# Patient Record
Sex: MALE | Race: ASIAN | NOT HISPANIC OR LATINO | URBAN - METROPOLITAN AREA
[De-identification: names, ages, dates, MRNs, and addresses within clinical notes are randomized per-mention and may not be internally consistent; named-entity substitution may affect disease eponyms.]

---

## 2018-05-13 ENCOUNTER — EMERGENCY (EMERGENCY)
Facility: HOSPITAL | Age: 73
LOS: 1 days | Discharge: HOME | End: 2018-05-13
Attending: EMERGENCY MEDICINE | Admitting: EMERGENCY MEDICINE
Payer: MEDICARE

## 2018-05-13 VITALS — SYSTOLIC BLOOD PRESSURE: 162 MMHG | DIASTOLIC BLOOD PRESSURE: 90 MMHG

## 2018-05-13 VITALS
OXYGEN SATURATION: 97 % | TEMPERATURE: 98 F | HEART RATE: 74 BPM | RESPIRATION RATE: 18 BRPM | DIASTOLIC BLOOD PRESSURE: 102 MMHG | SYSTOLIC BLOOD PRESSURE: 155 MMHG

## 2018-05-13 LAB
ALBUMIN SERPL ELPH-MCNC: 4.6 G/DL — SIGNIFICANT CHANGE UP (ref 3.3–5)
ALP SERPL-CCNC: 78 U/L — SIGNIFICANT CHANGE UP (ref 40–120)
ALT FLD-CCNC: 21 U/L — SIGNIFICANT CHANGE UP (ref 4–41)
AST SERPL-CCNC: 25 U/L — SIGNIFICANT CHANGE UP (ref 4–40)
BASE EXCESS BLDV CALC-SCNC: 9.8 MMOL/L — SIGNIFICANT CHANGE UP
BASOPHILS # BLD AUTO: 0.04 K/UL — SIGNIFICANT CHANGE UP (ref 0–0.2)
BASOPHILS NFR BLD AUTO: 0.6 % — SIGNIFICANT CHANGE UP (ref 0–2)
BILIRUB SERPL-MCNC: 0.8 MG/DL — SIGNIFICANT CHANGE UP (ref 0.2–1.2)
BLOOD GAS VENOUS - CREATININE: 0.81 MG/DL — SIGNIFICANT CHANGE UP (ref 0.5–1.3)
BUN SERPL-MCNC: 9 MG/DL — SIGNIFICANT CHANGE UP (ref 7–23)
CALCIUM SERPL-MCNC: 9.9 MG/DL — SIGNIFICANT CHANGE UP (ref 8.4–10.5)
CHLORIDE BLDV-SCNC: 102 MMOL/L — SIGNIFICANT CHANGE UP (ref 96–108)
CHLORIDE SERPL-SCNC: 97 MMOL/L — LOW (ref 98–107)
CO2 SERPL-SCNC: 30 MMOL/L — SIGNIFICANT CHANGE UP (ref 22–31)
CREAT SERPL-MCNC: 0.82 MG/DL — SIGNIFICANT CHANGE UP (ref 0.5–1.3)
EOSINOPHIL # BLD AUTO: 0.55 K/UL — HIGH (ref 0–0.5)
EOSINOPHIL NFR BLD AUTO: 8.8 % — HIGH (ref 0–6)
GAS PNL BLDV: 137 MMOL/L — SIGNIFICANT CHANGE UP (ref 136–146)
GLUCOSE BLDV-MCNC: 125 — HIGH (ref 70–99)
GLUCOSE SERPL-MCNC: 114 MG/DL — HIGH (ref 70–99)
HCO3 BLDV-SCNC: 28 MMOL/L — HIGH (ref 20–27)
HCT VFR BLD CALC: 50.7 % — HIGH (ref 39–50)
HCT VFR BLDV CALC: 54.1 % — HIGH (ref 39–51)
HGB BLD-MCNC: 17.2 G/DL — HIGH (ref 13–17)
HGB BLDV-MCNC: 17.7 G/DL — HIGH (ref 13–17)
IMM GRANULOCYTES # BLD AUTO: 0.01 # — SIGNIFICANT CHANGE UP
IMM GRANULOCYTES NFR BLD AUTO: 0.2 % — SIGNIFICANT CHANGE UP (ref 0–1.5)
LACTATE BLDV-MCNC: 1.6 MMOL/L — SIGNIFICANT CHANGE UP (ref 0.5–2)
LYMPHOCYTES # BLD AUTO: 1.46 K/UL — SIGNIFICANT CHANGE UP (ref 1–3.3)
LYMPHOCYTES # BLD AUTO: 23.3 % — SIGNIFICANT CHANGE UP (ref 13–44)
MCHC RBC-ENTMCNC: 30.5 PG — SIGNIFICANT CHANGE UP (ref 27–34)
MCHC RBC-ENTMCNC: 33.9 % — SIGNIFICANT CHANGE UP (ref 32–36)
MCV RBC AUTO: 89.9 FL — SIGNIFICANT CHANGE UP (ref 80–100)
MONOCYTES # BLD AUTO: 0.37 K/UL — SIGNIFICANT CHANGE UP (ref 0–0.9)
MONOCYTES NFR BLD AUTO: 5.9 % — SIGNIFICANT CHANGE UP (ref 2–14)
NEUTROPHILS # BLD AUTO: 3.84 K/UL — SIGNIFICANT CHANGE UP (ref 1.8–7.4)
NEUTROPHILS NFR BLD AUTO: 61.2 % — SIGNIFICANT CHANGE UP (ref 43–77)
NRBC # FLD: 0 — SIGNIFICANT CHANGE UP
PCO2 BLDV: 69 MMHG — HIGH (ref 41–51)
PH BLDV: 7.34 PH — SIGNIFICANT CHANGE UP (ref 7.32–7.43)
PLATELET # BLD AUTO: 268 K/UL — SIGNIFICANT CHANGE UP (ref 150–400)
PMV BLD: 9.4 FL — SIGNIFICANT CHANGE UP (ref 7–13)
PO2 BLDV: < 24 MMHG — LOW (ref 35–40)
POTASSIUM BLDV-SCNC: 4.3 MMOL/L — SIGNIFICANT CHANGE UP (ref 3.4–4.5)
POTASSIUM SERPL-MCNC: 4.6 MMOL/L — SIGNIFICANT CHANGE UP (ref 3.5–5.3)
POTASSIUM SERPL-SCNC: 4.6 MMOL/L — SIGNIFICANT CHANGE UP (ref 3.5–5.3)
PROT SERPL-MCNC: 7.8 G/DL — SIGNIFICANT CHANGE UP (ref 6–8.3)
RBC # BLD: 5.64 M/UL — SIGNIFICANT CHANGE UP (ref 4.2–5.8)
RBC # FLD: 12.8 % — SIGNIFICANT CHANGE UP (ref 10.3–14.5)
SAO2 % BLDV: 23.2 % — LOW (ref 60–85)
SODIUM SERPL-SCNC: 139 MMOL/L — SIGNIFICANT CHANGE UP (ref 135–145)
WBC # BLD: 6.27 K/UL — SIGNIFICANT CHANGE UP (ref 3.8–10.5)
WBC # FLD AUTO: 6.27 K/UL — SIGNIFICANT CHANGE UP (ref 3.8–10.5)

## 2018-05-13 PROCEDURE — 71046 X-RAY EXAM CHEST 2 VIEWS: CPT | Mod: 26

## 2018-05-13 PROCEDURE — 99284 EMERGENCY DEPT VISIT MOD MDM: CPT | Mod: GC

## 2018-05-13 RX ORDER — ALBUTEROL 90 UG/1
2 AEROSOL, METERED ORAL ONCE
Qty: 0 | Refills: 0 | Status: COMPLETED | OUTPATIENT
Start: 2018-05-13 | End: 2018-05-13

## 2018-05-13 RX ORDER — IPRATROPIUM/ALBUTEROL SULFATE 18-103MCG
3 AEROSOL WITH ADAPTER (GRAM) INHALATION ONCE
Qty: 0 | Refills: 0 | Status: COMPLETED | OUTPATIENT
Start: 2018-05-13 | End: 2018-05-13

## 2018-05-13 RX ADMIN — ALBUTEROL 2 PUFF(S): 90 AEROSOL, METERED ORAL at 15:11

## 2018-05-13 RX ADMIN — Medication 3 MILLILITER(S): at 12:41

## 2018-05-13 NOTE — ED ADULT TRIAGE NOTE - CHIEF COMPLAINT QUOTE
Patient reports dry cough since Thursday. Reports subjective SOB during coughing spells. Patient in no distress at rest. afebrile and denies chest pain

## 2018-05-13 NOTE — ED PROVIDER NOTE - PROGRESS NOTE DETAILS
Lab and CXR were unremarkable. Pt will be sent home with an MDI. All results d/w patient and copies given, in no distress, well appearing, wheezing res post neb, instructed to f/u pcp in next 2 days and bring results with him, vss, alejandro po, amb steadily. Pt will be sent home with an MDI.

## 2018-05-13 NOTE — ED PROVIDER NOTE - OBJECTIVE STATEMENT
71yo M w/ pmhx of HLD, recently started on Losartan for HTN, c/o SOB for 6 days. Pt also reports runny nose and wheezing. Denies any fever, chills, nausea, vomiting, chest pain, sick contacts, recent travel, leg swelling, hx of anemia, asthma, COPD, not smoker. Pt saw PCP for SOB on Wednesday, was given Z-Loco, which he started yesterday. Denies any other symptoms. 73yo M w/ pmhx of HLD, recently started on Losartan for HTN, c/o cough for 6 days. Pt also reports runny nose and wheezing. Denies any fever, chills, nausea, vomiting, chest pain, sick contacts, recent travel, leg swelling, hx of anemia, asthma, COPD, not smoker. Pt saw PCP for SOB on Wednesday, was given Z-Willis, which he started yesterday. Denies any other symptoms.

## 2018-05-13 NOTE — ED PROVIDER NOTE - MEDICAL DECISION MAKING DETAILS
SOB, runny nose and wheezing, but no hx of asthma, COPD, smoking, fever, chills, started Z-Willis yesterday, likely PNA vs Bronchitis  - basic labs, CXR, Duoneb

## 2018-05-13 NOTE — ED ADULT NURSE NOTE - CAS TRG GEN SKIN CONDITION
Warm 12.6   15.8  )-----------( 261      ( 13 Dec 2017 00:28 )             37.4   12-13    136  |  102  |  20<H>  ----------------------------<  168<H>  4.6   |  27  |  0.91    Ca    9.1      13 Dec 2017 00:28    TPro  8.3  /  Alb  4.1  /  TBili  0.5  /  DBili  x   /  AST  17  /  ALT  17  /  AlkPhos  78  12-13  PT/INR - ( 13 Dec 2017 00:28 )   PT: 10.5 sec;   INR: 0.96 ratio         PTT - ( 13 Dec 2017 00:28 )  PTT:32.4 sec    < from: CT Head No Cont (12.13.17 @ 01:56) >      IMPRESSION:    No acute intracranial pathology.    < end of copied text >    Vital Signs Last 24 Hrs  T(C): 36.7 (12 Dec 2017 23:24), Max: 36.7 (12 Dec 2017 23:24)  T(F): 98 (12 Dec 2017 23:24), Max: 98 (12 Dec 2017 23:24)  HR: 96 (12 Dec 2017 23:24) (96 - 96)  BP: 151/86 (12 Dec 2017 23:24) (151/86 - 151/86)  BP(mean): --  RR: 18 (12 Dec 2017 23:24) (18 - 18)  SpO2: 98% (12 Dec 2017 23:24) (98% - 98%)

## 2018-05-13 NOTE — ED ADULT NURSE NOTE - OBJECTIVE STATEMENT
Patient reports productive cough and SOB on exertion since Tuesday 05/08/2018. Patient reports seeing his PMD and was prescribed Z-pack and has been taking antibiotics. Patient reports medications has not relieved any symptoms. Patient reports SOB especially after taking stairs or lying flat for long periods of time. Patient denies any chest pain, syncope or any SOB at rest. Patient has unlabored respirations with bilateral chest rise, able to converse without difficulty. Patient denies any fever/chills, diaphoresis or edema in extremities.

## 2018-05-13 NOTE — ED PROVIDER NOTE - ATTENDING CONTRIBUTION TO CARE
Patient with cough x 5 days. Patient notes he developed cough, nasal congestion and feels sob during coughing episodes-all started approx 5 days ago. Saw his md who started him on azithromycin (started yesterday) and losartan for bp. Patient states occ prod of yellow sputum. no fevers, chills, ha, cp, abd pain, vomiting, diarrhea, dysuria, le edema. No recent travel or sick contact.  exam  GEN - NAD; well appearing; A+O x3   HEAD - NC/AT   EYES- PERRL, EOMI  ENT: Airway patent, mmm, Oral cavity and pharynx normal. No inflammation, swelling, exudate, or lesions.  NECK: Neck supple, non-tender without lymphadenopathy, no masses.  PULMONARY - int exp wheezes b/l, unlabored, no rales, symmetric breath sounds.   CARDIAC -s1s2, RRR, no M,G,R  ABDOMEN - +BS, ND, NT, soft, no guarding, no rebound, no masses   BACK - no CVA tenderness, Normal  spine   EXTREMITIES - FROM, symmetric pulses, capillary refill < 2 seconds, no edema   SKIN - no rash or bruising   NEUROLOGIC - alert, speech clear, no focal deficits  PSYCH -nl mood/affect, nl insight.  a/p-patient with cough/nasal congestion x 5 days, int exp wheezing noted b/l, vss, well appearing, will check labs, cxr, trial duoneb for likely bronchitis, monitor, reass.

## 2018-12-20 ENCOUNTER — EMERGENCY (EMERGENCY)
Facility: HOSPITAL | Age: 73
LOS: 1 days | Discharge: HOME | End: 2018-12-20
Attending: EMERGENCY MEDICINE | Admitting: EMERGENCY MEDICINE
Payer: MEDICARE

## 2018-12-20 VITALS
HEART RATE: 68 BPM | RESPIRATION RATE: 14 BRPM | OXYGEN SATURATION: 100 % | DIASTOLIC BLOOD PRESSURE: 90 MMHG | TEMPERATURE: 98 F | SYSTOLIC BLOOD PRESSURE: 167 MMHG

## 2018-12-20 VITALS
TEMPERATURE: 98 F | SYSTOLIC BLOOD PRESSURE: 164 MMHG | OXYGEN SATURATION: 100 % | DIASTOLIC BLOOD PRESSURE: 74 MMHG | RESPIRATION RATE: 17 BRPM | HEART RATE: 77 BPM

## 2018-12-20 PROBLEM — E78.00 PURE HYPERCHOLESTEROLEMIA, UNSPECIFIED: Chronic | Status: ACTIVE | Noted: 2018-05-13

## 2018-12-20 LAB
ALBUMIN SERPL ELPH-MCNC: 4.3 G/DL — SIGNIFICANT CHANGE UP (ref 3.3–5)
ALBUMIN SERPL ELPH-MCNC: 4.4 G/DL — SIGNIFICANT CHANGE UP (ref 3.3–5)
ALP SERPL-CCNC: 67 U/L — SIGNIFICANT CHANGE UP (ref 40–120)
ALP SERPL-CCNC: 69 U/L — SIGNIFICANT CHANGE UP (ref 40–120)
ALT FLD-CCNC: 16 U/L — SIGNIFICANT CHANGE UP (ref 4–41)
ALT FLD-CCNC: 17 U/L — SIGNIFICANT CHANGE UP (ref 4–41)
APTT BLD: 30.6 SEC — SIGNIFICANT CHANGE UP (ref 27.5–36.3)
APTT BLD: 31.6 SEC — SIGNIFICANT CHANGE UP (ref 27.5–36.3)
AST SERPL-CCNC: 24 U/L — SIGNIFICANT CHANGE UP (ref 4–40)
AST SERPL-CCNC: 26 U/L — SIGNIFICANT CHANGE UP (ref 4–40)
BILIRUB SERPL-MCNC: 0.5 MG/DL — SIGNIFICANT CHANGE UP (ref 0.2–1.2)
BILIRUB SERPL-MCNC: 0.7 MG/DL — SIGNIFICANT CHANGE UP (ref 0.2–1.2)
BLD GP AB SCN SERPL QL: NEGATIVE — SIGNIFICANT CHANGE UP
BUN SERPL-MCNC: 11 MG/DL — SIGNIFICANT CHANGE UP (ref 7–23)
BUN SERPL-MCNC: 12 MG/DL — SIGNIFICANT CHANGE UP (ref 7–23)
BUN SERPL-MCNC: 12 MG/DL — SIGNIFICANT CHANGE UP (ref 7–23)
BUN SERPL-MCNC: 14 MG/DL — SIGNIFICANT CHANGE UP (ref 7–23)
CALCIUM SERPL-MCNC: 8.9 MG/DL — SIGNIFICANT CHANGE UP (ref 8.4–10.5)
CALCIUM SERPL-MCNC: 9 MG/DL — SIGNIFICANT CHANGE UP (ref 8.4–10.5)
CALCIUM SERPL-MCNC: 9 MG/DL — SIGNIFICANT CHANGE UP (ref 8.4–10.5)
CALCIUM SERPL-MCNC: 9.5 MG/DL — SIGNIFICANT CHANGE UP (ref 8.4–10.5)
CHLORIDE SERPL-SCNC: 94 MMOL/L — LOW (ref 98–107)
CHLORIDE SERPL-SCNC: 97 MMOL/L — LOW (ref 98–107)
CHLORIDE SERPL-SCNC: 99 MMOL/L — SIGNIFICANT CHANGE UP (ref 98–107)
CHLORIDE SERPL-SCNC: 99 MMOL/L — SIGNIFICANT CHANGE UP (ref 98–107)
CO2 SERPL-SCNC: 23 MMOL/L — SIGNIFICANT CHANGE UP (ref 22–31)
CO2 SERPL-SCNC: 27 MMOL/L — SIGNIFICANT CHANGE UP (ref 22–31)
CREAT SERPL-MCNC: 0.75 MG/DL — SIGNIFICANT CHANGE UP (ref 0.5–1.3)
CREAT SERPL-MCNC: 0.79 MG/DL — SIGNIFICANT CHANGE UP (ref 0.5–1.3)
CREAT SERPL-MCNC: 0.81 MG/DL — SIGNIFICANT CHANGE UP (ref 0.5–1.3)
CREAT SERPL-MCNC: 0.81 MG/DL — SIGNIFICANT CHANGE UP (ref 0.5–1.3)
GLUCOSE SERPL-MCNC: 128 MG/DL — HIGH (ref 70–99)
GLUCOSE SERPL-MCNC: 131 MG/DL — HIGH (ref 70–99)
GLUCOSE SERPL-MCNC: 131 MG/DL — HIGH (ref 70–99)
GLUCOSE SERPL-MCNC: 164 MG/DL — HIGH (ref 70–99)
HCT VFR BLD CALC: 45.6 % — SIGNIFICANT CHANGE UP (ref 39–50)
HGB BLD-MCNC: 15.2 G/DL — SIGNIFICANT CHANGE UP (ref 13–17)
INR BLD: 1 — SIGNIFICANT CHANGE UP (ref 0.88–1.17)
INR BLD: 1.03 — SIGNIFICANT CHANGE UP (ref 0.88–1.17)
LIDOCAIN IGE QN: 40.4 U/L — SIGNIFICANT CHANGE UP (ref 7–60)
LIDOCAIN IGE QN: 46 U/L — SIGNIFICANT CHANGE UP (ref 7–60)
MCHC RBC-ENTMCNC: 30.3 PG — SIGNIFICANT CHANGE UP (ref 27–34)
MCHC RBC-ENTMCNC: 33.3 % — SIGNIFICANT CHANGE UP (ref 32–36)
MCV RBC AUTO: 90.8 FL — SIGNIFICANT CHANGE UP (ref 80–100)
NRBC # FLD: 0 — SIGNIFICANT CHANGE UP
PLATELET # BLD AUTO: 211 K/UL — SIGNIFICANT CHANGE UP (ref 150–400)
PMV BLD: 10.1 FL — SIGNIFICANT CHANGE UP (ref 7–13)
POTASSIUM SERPL-MCNC: 3.9 MMOL/L — SIGNIFICANT CHANGE UP (ref 3.5–5.3)
POTASSIUM SERPL-MCNC: 3.9 MMOL/L — SIGNIFICANT CHANGE UP (ref 3.5–5.3)
POTASSIUM SERPL-MCNC: 4.3 MMOL/L — SIGNIFICANT CHANGE UP (ref 3.5–5.3)
POTASSIUM SERPL-MCNC: 6.2 MMOL/L — CRITICAL HIGH (ref 3.5–5.3)
POTASSIUM SERPL-SCNC: 3.9 MMOL/L — SIGNIFICANT CHANGE UP (ref 3.5–5.3)
POTASSIUM SERPL-SCNC: 3.9 MMOL/L — SIGNIFICANT CHANGE UP (ref 3.5–5.3)
POTASSIUM SERPL-SCNC: 4.3 MMOL/L — SIGNIFICANT CHANGE UP (ref 3.5–5.3)
POTASSIUM SERPL-SCNC: 6.2 MMOL/L — CRITICAL HIGH (ref 3.5–5.3)
PROT SERPL-MCNC: 7 G/DL — SIGNIFICANT CHANGE UP (ref 6–8.3)
PROT SERPL-MCNC: 7.1 G/DL — SIGNIFICANT CHANGE UP (ref 6–8.3)
PROTHROM AB SERPL-ACNC: 11.1 SEC — SIGNIFICANT CHANGE UP (ref 9.8–13.1)
PROTHROM AB SERPL-ACNC: 11.8 SEC — SIGNIFICANT CHANGE UP (ref 9.8–13.1)
RBC # BLD: 5.02 M/UL — SIGNIFICANT CHANGE UP (ref 4.2–5.8)
RBC # FLD: 12.5 % — SIGNIFICANT CHANGE UP (ref 10.3–14.5)
RH IG SCN BLD-IMP: POSITIVE — SIGNIFICANT CHANGE UP
SODIUM SERPL-SCNC: 132 MMOL/L — LOW (ref 135–145)
SODIUM SERPL-SCNC: 135 MMOL/L — SIGNIFICANT CHANGE UP (ref 135–145)
SODIUM SERPL-SCNC: 138 MMOL/L — SIGNIFICANT CHANGE UP (ref 135–145)
SODIUM SERPL-SCNC: 138 MMOL/L — SIGNIFICANT CHANGE UP (ref 135–145)
TROPONIN T, HIGH SENSITIVITY: 12 NG/L — SIGNIFICANT CHANGE UP (ref ?–14)
TROPONIN T, HIGH SENSITIVITY: 19 NG/L — SIGNIFICANT CHANGE UP (ref ?–14)
TROPONIN T, HIGH SENSITIVITY: 23 NG/L — SIGNIFICANT CHANGE UP (ref ?–14)
WBC # BLD: 11.34 K/UL — HIGH (ref 3.8–10.5)
WBC # FLD AUTO: 11.34 K/UL — HIGH (ref 3.8–10.5)

## 2018-12-20 PROCEDURE — 99284 EMERGENCY DEPT VISIT MOD MDM: CPT | Mod: 25,GC

## 2018-12-20 PROCEDURE — 76705 ECHO EXAM OF ABDOMEN: CPT | Mod: 26

## 2018-12-20 PROCEDURE — 74177 CT ABD & PELVIS W/CONTRAST: CPT | Mod: 26

## 2018-12-20 PROCEDURE — 93010 ELECTROCARDIOGRAM REPORT: CPT

## 2018-12-20 PROCEDURE — 99053 MED SERV 10PM-8AM 24 HR FAC: CPT

## 2018-12-20 RX ORDER — LIDOCAINE 4 G/100G
10 CREAM TOPICAL ONCE
Qty: 0 | Refills: 0 | Status: COMPLETED | OUTPATIENT
Start: 2018-12-20 | End: 2018-12-20

## 2018-12-20 RX ORDER — SODIUM CHLORIDE 9 MG/ML
1000 INJECTION INTRAMUSCULAR; INTRAVENOUS; SUBCUTANEOUS
Qty: 0 | Refills: 0 | Status: DISCONTINUED | OUTPATIENT
Start: 2018-12-20 | End: 2018-12-20

## 2018-12-20 RX ORDER — FAMOTIDINE 10 MG/ML
20 INJECTION INTRAVENOUS ONCE
Qty: 0 | Refills: 0 | Status: COMPLETED | OUTPATIENT
Start: 2018-12-20 | End: 2018-12-20

## 2018-12-20 RX ORDER — SODIUM CHLORIDE 9 MG/ML
1000 INJECTION INTRAMUSCULAR; INTRAVENOUS; SUBCUTANEOUS ONCE
Qty: 0 | Refills: 0 | Status: COMPLETED | OUTPATIENT
Start: 2018-12-20 | End: 2018-12-20

## 2018-12-20 RX ORDER — ONDANSETRON 8 MG/1
1 TABLET, FILM COATED ORAL
Qty: 6 | Refills: 0 | OUTPATIENT
Start: 2018-12-20 | End: 2018-12-21

## 2018-12-20 RX ORDER — CEFOTETAN DISODIUM 1 G
2 VIAL (EA) INJECTION ONCE
Qty: 0 | Refills: 0 | Status: COMPLETED | OUTPATIENT
Start: 2018-12-20 | End: 2018-12-20

## 2018-12-20 RX ORDER — ONDANSETRON 8 MG/1
4 TABLET, FILM COATED ORAL ONCE
Qty: 0 | Refills: 0 | Status: COMPLETED | OUTPATIENT
Start: 2018-12-20 | End: 2018-12-20

## 2018-12-20 RX ADMIN — Medication 30 MILLILITER(S): at 06:18

## 2018-12-20 RX ADMIN — FAMOTIDINE 20 MILLIGRAM(S): 10 INJECTION INTRAVENOUS at 06:19

## 2018-12-20 RX ADMIN — Medication 100 GRAM(S): at 11:24

## 2018-12-20 RX ADMIN — Medication 2 GRAM(S): at 11:51

## 2018-12-20 RX ADMIN — ONDANSETRON 4 MILLIGRAM(S): 8 TABLET, FILM COATED ORAL at 06:18

## 2018-12-20 RX ADMIN — SODIUM CHLORIDE 1333.33 MILLILITER(S): 9 INJECTION INTRAMUSCULAR; INTRAVENOUS; SUBCUTANEOUS at 06:19

## 2018-12-20 RX ADMIN — SODIUM CHLORIDE 1000 MILLILITER(S): 9 INJECTION INTRAMUSCULAR; INTRAVENOUS; SUBCUTANEOUS at 07:13

## 2018-12-20 RX ADMIN — SODIUM CHLORIDE 100 MILLILITER(S): 9 INJECTION INTRAMUSCULAR; INTRAVENOUS; SUBCUTANEOUS at 11:24

## 2018-12-20 RX ADMIN — LIDOCAINE 10 MILLILITER(S): 4 CREAM TOPICAL at 06:18

## 2018-12-20 NOTE — CONSULT NOTE ADULT - SUBJECTIVE AND OBJECTIVE BOX
CC: 72y old Male admitted with a chief complaint of abdominal pain, now    HPI:· Patient is a 71 y/o male w/ pmhx HTN, HLD presnted to the ED with abdominal pain. Patient states at 11pm last night he started having epigastric and generalized abdominal discomfort, 10/10, with associated ten episodes of vomiting. The pain started about an hour after he ate dinner. He had one formed bowel movement soon after food intake. Denies CP, SOB, palpitations, fevers/chills, diarrhea, constipation, melena, hematochezia.  No history of CAD. States he had a stress test earlier this year which was normal. He has never had an episode like this before.      PMHx: High cholesterol    PSHx: No significant past surgical history    Medications (inpatient):   Medications (PRN):  Allergies: No Known Allergies    Family Hx: No pertinent family history in first degree relatives      Physical Exam  T(C): 36.8  HR: 77 (68 - 77)  BP: 164/74 (164/74 - 179/80)  RR: 17 (14 - 18)  SpO2: 100% (100% - 100%)  Tmax: T(C): , Max: 36.9 (12-20-18 @ 05:40)    General: well developed, well nourished, NAD  Neuro: alert and oriented, no focal deficits, moves all extremities spontaneously  HEENT: NCAT, EOMI, anicteric, mucosa moist  Respiratory: airway patent, respirations unlabored  CVS: regular rate and rhythm  Abdomen: soft, nontender, nondistended  Extremities: no edema, sensation and movement grossly intact  Skin: warm, dry, appropriate color    Labs:                        15.2   11.34 )-----------( 211      ( 20 Dec 2018 06:29 )             45.6     PT/INR - ( 20 Dec 2018 10:09 )   PT: 11.8 SEC;   INR: 1.03          PTT - ( 20 Dec 2018 10:09 )  PTT:31.6 SEC  12-20    135  |  97<L>  |  11  ----------------------------<  128<H>  4.3   |  27  |  0.75    Ca    8.9      20 Dec 2018 10:09    TPro  7.1  /  Alb  4.3  /  TBili  0.7  /  DBili  x   /  AST  26  /  ALT  17  /  AlkPhos  67  12-20            Imaging and other studies:    < from: US Abdomen Limited (12.20.18 @ 09:38) >  FINDINGS:    Liver: Within normal limits.    Bile ducts: Normal caliber. Common hepatic duct measures 4 mm.     Gallbladder: Minimal intraluminal sludge with mild gallbladder wall   thickening. Negative sonographic Nur's sign.        < end of copied text >      < from: CT Abdomen and Pelvis w/ IV Cont (12.20.18 @ 09:06) >  IMPRESSION:     Acute cholecystitis.    A 4 mm stone within the cystic duct.    < end of copied text >

## 2018-12-20 NOTE — CONSULT NOTE ADULT - ASSESSMENT
Patient is a 72M with acute cholecystitis.  Pain is currently improved and patient does not want surgery at this time.    - PO challenge  - Can d/c home with 1 week of augmentin it patient tolerates PO  - Patient can follow up with Dr. Lindsey as outpatient to schedule an elective cholecystectomy  - Case discussed with Dr. Lindsey.    LÓPEZ Batista PGY2  [phone]

## 2018-12-20 NOTE — ED PROVIDER NOTE - PHYSICAL EXAMINATION
PHYSICAL EXAM:  GENERAL: NAD, well-groomed, well-developed  HEAD:  Atraumatic, Normocephalic  EYES: EOMI, PERRLA, conjunctiva and sclera clear  ENMT: No tonsillar erythema, exudates, or enlargement; Moist mucous membranes, Good dentition, No lesions  NECK: Supple, No JVD, Normal thyroid  CHEST/LUNG: Clear to percussion bilaterally; No rales, rhonchi, wheezing, or rubs  HEART: Regular rate and rhythm; No murmurs, rubs, or gallops  ABDOMEN: Soft, Nontender, Nondistended; Bowel sounds present  EXTREMITIES:  2+ Peripheral Pulses, No clubbing, cyanosis, or edema  LYMPH: No lymphadenopathy noted  SKIN: No rashes or lesions  NERVOUS SYSTEM:  Alert & Oriented X3, Good concentration; Motor Strength 5/5 B/L upper and lower extremities; DTRs 2+ intact and symmetric

## 2018-12-20 NOTE — ED PROVIDER NOTE - MEDICAL DECISION MAKING DETAILS
Patient w/ abdominal pain, most likely 2/2 food ingestion, possible GERD, gastroenteritis, however must r/o ACS as patient elderly, hx of HTN. Will obtain CBC, BMP, trops, ekg. Will give IVF, pepcid, zofran, viscous lidocaine.

## 2018-12-20 NOTE — ED PROVIDER NOTE - PROGRESS NOTE DETAILS
AG PGY2: patient reports pain is improved, but still in some discomfort. slightly tender in RUQ. Discussed ultrasound to evaluate for biliary etiology. Patient requesting CT at this time. AG PGY2: surg recommendation to go home on augmentin and f/u with surg next week. Well appearing and VSS. will po challenge and d/c home with return precautions.

## 2018-12-20 NOTE — ED PROVIDER NOTE - CHPI ED SYMPTOMS NEG
no abdominal distension/no blood in stool/no dysuria/no fever/no diarrhea/no burning urination/no chills/no hematuria

## 2018-12-20 NOTE — ED PROVIDER NOTE - OBJECTIVE STATEMENT
71 y/o male w/ pmhx HTN, HLD p/w c/o of 71 y/o [place]an male w/ pmhx HTN, HLD p/w c/o of abdominal pain. Patient states at 11pm, he started having epigastric and generalized abdominal discomfort, 10/10, with associated ten episodes of vomiting. He states prior to this, he ate dinner at 10:00pm, having fish leos, cabbage, ice cream, and papayaya. Had one formed bowel movement soon after food intake. Denies CP, SOB, palpitations, fevers/chills, palpitations, diarrhea, constipation, melena, hematochezia. No one in the family with similar symptoms. No history of CAD. States he had a stress test earlier this year which was normal. No smoking history. Occasional etoh intake. Currently pain has reduced to 7/10.

## 2018-12-20 NOTE — ED PROVIDER NOTE - CARE PROVIDER_API CALL
Gómez Lindsey), Dietitian nutritionist; Surgery; Surgical Critical Care  [address] [facility]  [address] [address]  Phone: [phone]  Fax: [phone]

## 2018-12-20 NOTE — ED ADULT NURSE REASSESSMENT NOTE - NS ED NURSE REASSESS COMMENT FT1
Received report from SHASHI Naranjo. Pt A&OX3 laying in bed comfortably, family at bedside. Pt c/o abdominal pain. Respirations equal, nonlabored, no sign of respiratory distress. Abdomen soft, tender to touch, no distention noted. 18 gauge noted to left hand flushes well no sign of redness or infiltration. Denies chest pain, SOB, N/V/D at this time. Pt stable, will monitor

## 2018-12-20 NOTE — ED ADULT NURSE NOTE - OBJECTIVE STATEMENT
Pt brought into room 15. A&OX4 ambulatory self care male presents to the ED today for generalized abdominal pain with vomiting since 11pm. Patient states he ate fish and ice-cream last night and had 10 episodes of vomiting. Patient records one bowel movement after eating. Upon assessment abdomin is soft and non tender to touch. Bowel sounds present in all 4 quadrants. Denies sob/cp. 18G IV placed in right AC. Labs sent. Medications given as ordered.

## 2018-12-20 NOTE — ED PROVIDER NOTE - NS ED ROS FT
REVIEW OF SYSTEMS:  CONSTITUTIONAL: No fever, weight loss, or fatigue  EYES: No eye pain, visual disturbances, or discharge  ENMT:  No difficulty hearing, tinnitus, vertigo; No sinus or throat pain  NECK: No pain or stiffness  BREASTS: No pain, masses, or nipple discharge  RESPIRATORY: No cough, wheezing, chills or hemoptysis; No shortness of breath  CARDIOVASCULAR: No chest pain, palpitations, dizziness, or leg swelling  GASTROINTESTINAL: No abdominal or epigastric pain. No nausea, vomiting, or hematemesis; No diarrhea or constipation. No melena or hematochezia.  GENITOURINARY: No dysuria, frequency, hematuria, or incontinence  NEUROLOGICAL: No headaches, memory loss, loss of strength, numbness, or tremors  SKIN: No itching, burning, rashes, or lesions   LYMPH NODES: No enlarged glands  ENDOCRINE: No heat or cold intolerance; No hair loss  MUSCULOSKELETAL: No joint pain or swelling; No muscle, back, or extremity pain  PSYCHIATRIC: No depression, anxiety, mood swings, or difficulty sleeping  HEME/LYMPH: No easy bruising, or bleeding gums  ALLERY AND IMMUNOLOGIC: No hives or eczema REVIEW OF SYSTEMS:  CONSTITUTIONAL: No fever, weight loss, or fatigue  EYES: No eye pain, visual disturbances, or discharge  ENMT:  No difficulty hearing, tinnitus, vertigo; No sinus or throat pain  NECK: No pain or stiffness  RESPIRATORY: No cough, wheezing, chills or hemoptysis; No shortness of breath  CARDIOVASCULAR: No chest pain, palpitations, dizziness, or leg swelling  GASTROINTESTINAL: +abdominal pain-diffuse. + vomiting. no hematemesis; No diarrhea or constipation. No melena or hematochezia.  GENITOURINARY: No dysuria, frequency, hematuria, or incontinence  SKIN: No itching, burning, rashes, or lesions   LYMPH NODES: No enlarged glands  ENDOCRINE: No heat or cold intolerance; No hair loss  MUSCULOSKELETAL: No joint pain or swelling; No muscle, back, or extremity pain  HEME/LYMPH: No easy bruising, or bleeding gums  ALLERY AND IMMUNOLOGIC: No hives or eczema

## 2018-12-20 NOTE — ED PROVIDER NOTE - NSFOLLOWUPCLINICS_GEN_ALL_ED_FT
[facility]  General Surgery  [address] [place] [address] - [address] [place] [address]  Phone: [phone]  Fax:   Follow Up Time:

## 2018-12-20 NOTE — ED PROVIDER NOTE - ATTENDING CONTRIBUTION TO CARE
MD Loo:  I performed a face to face bedside interview with patient regarding history of present illness, review of symptoms and past medical history. I completed an independent physical exam(documented below).  I have discussed patient's plan of care with resident.   I agree with note as stated above, having amended the EMR as needed to reflect my findings. I have discussed the assessment and plan of care.  This includes during the time I functioned as the attending physician for this patient.  PE:  Gen: Alert, NAD  Head: NC, AT,  EOMI, normal lids/conjunctiva  ENT:  normal hearing, patent oropharynx without erythema/exudate  Neck: +supple, no tenderness/meningismus/JVD, +Trachea midline  Chest: no chest wall tenderness, equal chest rise  Pulm: Bilateral BS, normal resp effort, no wheeze/stridor/retractions  CV: RRR, no M/R/G, +dist pulses  Abd: +BS, soft, ND, nttp on my exam  Rectal: deferred  Mskel: no edema/erythema/cyanosis  Skin: no rash  Neuro: AAOx3  MDM:  71yo M w/ pmh of htn, hld, c/o epigastric burning pain, generalized abd discomfort, nausea and 10episodes of vomiting, all started at 11pm approx 1hr after eating fish leos,cabbage, ice cream and papaya. Pt states he had a normal stress test earlier this year. Denies cp, sob, palpitations. No PE risk factors. Ddx includes GERD vs atypical ACS vs gastritis. ECG is NSR w/ incomplete RBBB(seen on previous ECG as well), without ischemic findings. Labs including trop, meds, reassess.

## 2018-12-20 NOTE — ED ADULT TRIAGE NOTE - CHIEF COMPLAINT QUOTE
pt c/o generalized abdominal pain since yesterday. Pt states pain/multiple episodes of vomiting post eating fish and ice cream. Pt appears in NAD. Hx of HLD, no abdominal surgeries.

## 2018-12-21 NOTE — ED POST DISCHARGE NOTE - ADDITIONAL DOCUMENTATION
Spoke with patient's daughter Iesha who states patient currently at [facility] and will be getting a cholecystectomy.  Discussed results with daughter who is patient's health care proxy who states patient has cardiologist Dr. Olivo [phone] who is aware of results and will be following up with patient.

## 2018-12-23 ENCOUNTER — INPATIENT (INPATIENT)
Facility: HOSPITAL | Age: 73
LOS: 2 days | Discharge: HOME | End: 2018-12-26
Attending: INTERNAL MEDICINE | Admitting: INTERNAL MEDICINE
Payer: MEDICARE

## 2018-12-23 VITALS
HEART RATE: 78 BPM | OXYGEN SATURATION: 98 % | TEMPERATURE: 99 F | RESPIRATION RATE: 18 BRPM | SYSTOLIC BLOOD PRESSURE: 140 MMHG | DIASTOLIC BLOOD PRESSURE: 84 MMHG

## 2018-12-23 DIAGNOSIS — K81.9 CHOLECYSTITIS, UNSPECIFIED: ICD-10-CM

## 2018-12-23 LAB
ALBUMIN SERPL ELPH-MCNC: 3.8 G/DL — SIGNIFICANT CHANGE UP (ref 3.3–5)
ALP SERPL-CCNC: 75 U/L — SIGNIFICANT CHANGE UP (ref 40–120)
ALT FLD-CCNC: 24 U/L — SIGNIFICANT CHANGE UP (ref 4–41)
APPEARANCE UR: CLEAR — SIGNIFICANT CHANGE UP
APTT BLD: 33.2 SEC — SIGNIFICANT CHANGE UP (ref 27.5–36.3)
AST SERPL-CCNC: 65 U/L — HIGH (ref 4–40)
BACTERIA # UR AUTO: NEGATIVE — SIGNIFICANT CHANGE UP
BASOPHILS # BLD AUTO: 0.03 K/UL — SIGNIFICANT CHANGE UP (ref 0–0.2)
BASOPHILS NFR BLD AUTO: 0.3 % — SIGNIFICANT CHANGE UP (ref 0–2)
BILIRUB SERPL-MCNC: 0.8 MG/DL — SIGNIFICANT CHANGE UP (ref 0.2–1.2)
BILIRUB UR-MCNC: NEGATIVE — SIGNIFICANT CHANGE UP
BLD GP AB SCN SERPL QL: NEGATIVE — SIGNIFICANT CHANGE UP
BLOOD UR QL VISUAL: NEGATIVE — SIGNIFICANT CHANGE UP
BUN SERPL-MCNC: 13 MG/DL — SIGNIFICANT CHANGE UP (ref 7–23)
CALCIUM SERPL-MCNC: 9.5 MG/DL — SIGNIFICANT CHANGE UP (ref 8.4–10.5)
CHLORIDE SERPL-SCNC: 97 MMOL/L — LOW (ref 98–107)
CO2 SERPL-SCNC: 26 MMOL/L — SIGNIFICANT CHANGE UP (ref 22–31)
COLOR SPEC: YELLOW — SIGNIFICANT CHANGE UP
CREAT SERPL-MCNC: 0.91 MG/DL — SIGNIFICANT CHANGE UP (ref 0.5–1.3)
EOSINOPHIL # BLD AUTO: 0.05 K/UL — SIGNIFICANT CHANGE UP (ref 0–0.5)
EOSINOPHIL NFR BLD AUTO: 0.5 % — SIGNIFICANT CHANGE UP (ref 0–6)
GLUCOSE SERPL-MCNC: 84 MG/DL — SIGNIFICANT CHANGE UP (ref 70–99)
GLUCOSE UR-MCNC: NEGATIVE — SIGNIFICANT CHANGE UP
HCT VFR BLD CALC: 43.8 % — SIGNIFICANT CHANGE UP (ref 39–50)
HGB BLD-MCNC: 14.9 G/DL — SIGNIFICANT CHANGE UP (ref 13–17)
HYALINE CASTS # UR AUTO: SIGNIFICANT CHANGE UP
IMM GRANULOCYTES # BLD AUTO: 0.02 # — SIGNIFICANT CHANGE UP
IMM GRANULOCYTES NFR BLD AUTO: 0.2 % — SIGNIFICANT CHANGE UP (ref 0–1.5)
INR BLD: 1.12 — SIGNIFICANT CHANGE UP (ref 0.88–1.17)
KETONES UR-MCNC: HIGH
LEUKOCYTE ESTERASE UR-ACNC: NEGATIVE — SIGNIFICANT CHANGE UP
LYMPHOCYTES # BLD AUTO: 0.96 K/UL — LOW (ref 1–3.3)
LYMPHOCYTES # BLD AUTO: 10.1 % — LOW (ref 13–44)
MCHC RBC-ENTMCNC: 30.8 PG — SIGNIFICANT CHANGE UP (ref 27–34)
MCHC RBC-ENTMCNC: 34 % — SIGNIFICANT CHANGE UP (ref 32–36)
MCV RBC AUTO: 90.7 FL — SIGNIFICANT CHANGE UP (ref 80–100)
MONOCYTES # BLD AUTO: 0.89 K/UL — SIGNIFICANT CHANGE UP (ref 0–0.9)
MONOCYTES NFR BLD AUTO: 9.3 % — SIGNIFICANT CHANGE UP (ref 2–14)
NEUTROPHILS # BLD AUTO: 7.57 K/UL — HIGH (ref 1.8–7.4)
NEUTROPHILS NFR BLD AUTO: 79.6 % — HIGH (ref 43–77)
NITRITE UR-MCNC: NEGATIVE — SIGNIFICANT CHANGE UP
NRBC # FLD: 0 — SIGNIFICANT CHANGE UP
PH UR: 6 — SIGNIFICANT CHANGE UP (ref 5–8)
PLATELET # BLD AUTO: 183 K/UL — SIGNIFICANT CHANGE UP (ref 150–400)
PMV BLD: 9.4 FL — SIGNIFICANT CHANGE UP (ref 7–13)
POTASSIUM SERPL-MCNC: 3.5 MMOL/L — SIGNIFICANT CHANGE UP (ref 3.5–5.3)
POTASSIUM SERPL-SCNC: 3.5 MMOL/L — SIGNIFICANT CHANGE UP (ref 3.5–5.3)
PROT SERPL-MCNC: 7.6 G/DL — SIGNIFICANT CHANGE UP (ref 6–8.3)
PROT UR-MCNC: 50 — SIGNIFICANT CHANGE UP
PROTHROM AB SERPL-ACNC: 12.5 SEC — SIGNIFICANT CHANGE UP (ref 9.8–13.1)
RBC # BLD: 4.83 M/UL — SIGNIFICANT CHANGE UP (ref 4.2–5.8)
RBC # FLD: 12.8 % — SIGNIFICANT CHANGE UP (ref 10.3–14.5)
RBC CASTS # UR COMP ASSIST: SIGNIFICANT CHANGE UP (ref 0–?)
RH IG SCN BLD-IMP: POSITIVE — SIGNIFICANT CHANGE UP
SODIUM SERPL-SCNC: 138 MMOL/L — SIGNIFICANT CHANGE UP (ref 135–145)
SP GR SPEC: 1.03 — SIGNIFICANT CHANGE UP (ref 1–1.04)
SQUAMOUS # UR AUTO: SIGNIFICANT CHANGE UP
UROBILINOGEN FLD QL: NORMAL — SIGNIFICANT CHANGE UP
WBC # BLD: 9.52 K/UL — SIGNIFICANT CHANGE UP (ref 3.8–10.5)
WBC # FLD AUTO: 9.52 K/UL — SIGNIFICANT CHANGE UP (ref 3.8–10.5)
WBC UR QL: SIGNIFICANT CHANGE UP (ref 0–?)

## 2018-12-23 PROCEDURE — 74183 MRI ABD W/O CNTR FLWD CNTR: CPT | Mod: 26

## 2018-12-23 PROCEDURE — 76705 ECHO EXAM OF ABDOMEN: CPT | Mod: 26

## 2018-12-23 PROCEDURE — 99222 1ST HOSP IP/OBS MODERATE 55: CPT | Mod: GC

## 2018-12-23 RX ORDER — ATORVASTATIN CALCIUM 80 MG/1
40 TABLET, FILM COATED ORAL AT BEDTIME
Qty: 0 | Refills: 0 | Status: DISCONTINUED | OUTPATIENT
Start: 2018-12-23 | End: 2018-12-24

## 2018-12-23 RX ORDER — METRONIDAZOLE 500 MG
500 TABLET ORAL ONCE
Qty: 0 | Refills: 0 | Status: DISCONTINUED | OUTPATIENT
Start: 2018-12-23 | End: 2018-12-23

## 2018-12-23 RX ORDER — ACETAMINOPHEN 500 MG
650 TABLET ORAL ONCE
Qty: 0 | Refills: 0 | Status: COMPLETED | OUTPATIENT
Start: 2018-12-23 | End: 2018-12-23

## 2018-12-23 RX ORDER — CEFOTETAN DISODIUM 1 G
2 VIAL (EA) INJECTION EVERY 12 HOURS
Qty: 0 | Refills: 0 | Status: DISCONTINUED | OUTPATIENT
Start: 2018-12-24 | End: 2018-12-26

## 2018-12-23 RX ORDER — CEFOTETAN DISODIUM 1 G
VIAL (EA) INJECTION
Qty: 0 | Refills: 0 | Status: DISCONTINUED | OUTPATIENT
Start: 2018-12-24 | End: 2018-12-26

## 2018-12-23 RX ORDER — CIPROFLOXACIN LACTATE 400MG/40ML
400 VIAL (ML) INTRAVENOUS ONCE
Qty: 0 | Refills: 0 | Status: COMPLETED | OUTPATIENT
Start: 2018-12-23 | End: 2018-12-23

## 2018-12-23 RX ORDER — LISINOPRIL 2.5 MG/1
50 TABLET ORAL DAILY
Qty: 0 | Refills: 0 | Status: DISCONTINUED | OUTPATIENT
Start: 2018-12-23 | End: 2018-12-26

## 2018-12-23 RX ORDER — CEFOTETAN DISODIUM 1 G
2 VIAL (EA) INJECTION ONCE
Qty: 0 | Refills: 0 | Status: COMPLETED | OUTPATIENT
Start: 2018-12-24 | End: 2018-12-24

## 2018-12-23 RX ORDER — PIPERACILLIN AND TAZOBACTAM 4; .5 G/20ML; G/20ML
3.38 INJECTION, POWDER, LYOPHILIZED, FOR SOLUTION INTRAVENOUS ONCE
Qty: 0 | Refills: 0 | Status: COMPLETED | OUTPATIENT
Start: 2018-12-23 | End: 2018-12-23

## 2018-12-23 RX ORDER — ENOXAPARIN SODIUM 100 MG/ML
40 INJECTION SUBCUTANEOUS DAILY
Qty: 0 | Refills: 0 | Status: DISCONTINUED | OUTPATIENT
Start: 2018-12-23 | End: 2018-12-24

## 2018-12-23 RX ADMIN — Medication 200 MILLIGRAM(S): at 16:17

## 2018-12-23 RX ADMIN — PIPERACILLIN AND TAZOBACTAM 200 GRAM(S): 4; .5 INJECTION, POWDER, LYOPHILIZED, FOR SOLUTION INTRAVENOUS at 17:06

## 2018-12-23 RX ADMIN — Medication 650 MILLIGRAM(S): at 21:03

## 2018-12-23 RX ADMIN — Medication 650 MILLIGRAM(S): at 18:09

## 2018-12-23 RX ADMIN — PIPERACILLIN AND TAZOBACTAM 3.38 GRAM(S): 4; .5 INJECTION, POWDER, LYOPHILIZED, FOR SOLUTION INTRAVENOUS at 17:40

## 2018-12-23 NOTE — ED PROVIDER NOTE - OBJECTIVE STATEMENT
73 y/o Malag[place] male w/ pmhx HTN, HLD p/w c/o of abdominal pain. Patient was seen in [facility] ED on 12/20 for abdominal pain  after he ate dinner at 10:00pm, having fish leos, cabbage, ice cream, and papayaya. Was found to have acute cholecystis on CT A/P at that time. Was seen in [facility] for cholecystectomy eval, left AMA to come to [facility] for eval for cholecystectomy 71 y/o [place]uv[place] male w/ pmhx HTN, HLD p/w c/o of abdominal pain. Patient was seen in [facility] ED on 12/20 for abdominal pain  after he ate dinner at 10:00pm, having fish leos, cabbage, ice cream, and papayaya. Was found to have acute cholecystis on CT A/P at that time. Was seen in [facility] for cholecystectomy eval, left AMA to come to [facility] for eval for cholecystectomy. Per the son, pt was admitted to the surgical service in [facility], made NPO, started on broad spectrum abx and was being evaluated for surgery. Patient endorses intermittent fever/chills, RUQ pain. No diarrhea, nausea, vomiting.

## 2018-12-23 NOTE — CONSULT NOTE ADULT - SUBJECTIVE AND OBJECTIVE BOX
Interventional Radiology Consult Note:    73 y/o male w/ pmhx HTN, HLD presents with worsening abdominal pain. Patient was recently seen at the [facility] ED on 12/20 for acute cholecystis. He left AMA at the time for second opinion at [facility] and after being dissatisfied with care there, also signed out AMA. Per the son, pt was admitted to the surgical service in [facility], made NPO, started on broad spectrum abx and was being evaluated for surgery. Patient endorses intermittent fever/chills, RUQ pain. No diarrhea, nausea, vomiting. Interventional radiology consulted for percutaneous cholecystostomy tube placement.      PAST MEDICAL & SURGICAL HISTORY:  Essential hypertension  High cholesterol  No significant past surgical history      FAMILY HISTORY:  No pertinent family history in first degree relatives      Allergies    Cipro (Rash)  morphine (Hives)    MEDICATIONS  (STANDING):  atorvastatin 40 milliGRAM(s) Oral at bedtime  cefoTEtan  IVPB      enoxaparin Injectable 40 milliGRAM(s) SubCutaneous daily  lisinopril 50 milliGRAM(s) Oral daily  Vital Signs Last 24 Hrs  T(C): 37.7 (23 Dec 2018 15:01), Max: 37.7 (23 Dec 2018 15:01)  T(F): 99.9 (23 Dec 2018 15:01), Max: 99.9 (23 Dec 2018 15:01)  HR: 81 (23 Dec 2018 17:40) (78 - 81)  BP: 148/72 (23 Dec 2018 17:40) (138/83 - 148/72)  BP(mean): --  RR: 18 (23 Dec 2018 17:40) (18 - 18)  SpO2: 100% (23 Dec 2018 17:40) (98% - 100%)    CBC                        14.9   9.52  )-----------( 183      ( 23 Dec 2018 14:40 )             43.8       Chemistry  12-23    138  |  97<L>  |  13  ----------------------------<  84  3.5   |  26  |  0.91    Ca    9.5      23 Dec 2018 14:40    TPro  7.6  /  Alb  3.8  /  TBili  0.8  /  DBili  x   /  AST  65<H>  /  ALT  24  /  AlkPhos  75  12-23      PT/INR - ( 23 Dec 2018 14:40 )   PT: 12.5 SEC;   INR: 1.12          PTT - ( 23 Dec 2018 14:40 )  PTT:33.2 SEC

## 2018-12-23 NOTE — ED PROVIDER NOTE - TOBACCO USE
"  [facility]      Procedure Note     Baby1 Gianna Krishnamurthy  MRN# [id number]    The Umbilical Venous Catheter was removed on 2018, 8:50 PM because it was no longer required for Tamari's care.  A final verification (\"time out\") was performed to ensure the correct patient and agreement regarding Umbilical Venous Catheter removal. The Umbilical Venous Catheter was removed by this author, and was intact. The procedure was performed without difficulty and she tolerated the procedure well with no immediate complications.     Shannon Luther PA-C  8:50 PM 2018   Advanced Practice Provider  [facility]    " Never smoker

## 2018-12-23 NOTE — ED ADULT NURSE NOTE - OBJECTIVE STATEMENT
Pt received in exam room 15. alert and oriented x3, ambulatory. Pt was seen in ED x 3 days ago and diagnosed with cholecystis and told he will need surgery. Pt left because he wanted to consult with his primary doctor. Private doctor sent him to [facility] for surgery. Family states they did not like the care he was receiving at [facility] and stating "the doctors didn't want to do surgery and were just giving him antibiotics". Decided to come back to [facility] for treatment. Labs sent. IV placed. VS as stated. Rectal temp 99.9. Comfort measures provided. Skin intact. Will continue to monitor.

## 2018-12-23 NOTE — ED PROVIDER NOTE - NS ED ROS FT
Constitutional: +fevers, chills. Negative night sweats, weight loss  HEENT: denies visual changes, hearing changes, rhinitis, odynophagia, or dysphagia  Cardiovascular: denies palpitations, chest pain, edema  Respiratory: denies SOB, wheezing  Gastrointestinal: + abdominal pain denies N/V/D, hematochezia, melena  : denies dysuria, hematuria  MSK: denies weakness, joint pain  Neuro: Denies Paresthesia/ weakness

## 2018-12-23 NOTE — ED PROVIDER NOTE - MEDICAL DECISION MAKING DETAILS
72y M with HLD, HTN p/w recurrent abdominal pain from [facility] for acute cholecystis. Will check cbc, cmp, PTT/INR, T &S. Surgery consult

## 2018-12-23 NOTE — ED ADULT NURSE NOTE - NSIMPLEMENTINTERV_GEN_ALL_ED
Implemented All Universal Safety Interventions:  Llewellyn to call system. Call bell, personal items and telephone within reach. Instruct patient to call for assistance. Room bathroom lighting operational. Non-slip footwear when patient is off stretcher. Physically safe environment: no spills, clutter or unnecessary equipment. Stretcher in lowest position, wheels locked, appropriate side rails in place.

## 2018-12-23 NOTE — H&P ADULT - NSHPLABSRESULTS_GEN_ALL_CORE
14.9   9.52  )-----------( 183      ( 23 Dec 2018 14:40 )             43.8   12-23    138  |  97<L>  |  13  ----------------------------<  84  3.5   |  26  |  0.91    Ca    9.5      23 Dec 2018 14:40    TPro  7.6  /  Alb  3.8  /  TBili  0.8  /  DBili  x   /  AST  65<H>  /  ALT  24  /  AlkPhos  75  12-23  < from: US Abdomen Limited (12.23.18 @ 16:01) >    FINDINGS:    Liver: Within normal limits.    Bile ducts: Mild intrahepatic biliary ductal dilatation. Common hepatic   duct measures 7 mm.     Gallbladder: Distended gallbladder. Gallbladder sludge. Thickened   edematous gallbladder wall. Trace pericholecystic fluid..    Pancreas: Visualized portions are within normal limits. Limited   evaluation of the pancreatic head and tail.    Right kidney: 12.6 cm. No hydronephrosis.    Ascites: None.    IVC: Visualized portions are within normal limits.    IMPRESSION:     Distended gallbladder. Thickened, edematous gallbladder wall with trace   pericholecystic fluid, concerning for cholecystitis. Sludge is seen   within the gallbladder lumen. Confirmation with HIDA scan is suggested.    < end of copied text >    < from: US Abdomen Limited (12.20.18 @ 09:38) >    FINDINGS:    Liver: Within normal limits.    Bile ducts: Normal caliber. Common hepatic duct measures 4 mm.     Gallbladder: Minimal intraluminal sludge with mild gallbladder wall   thickening. Negative sonographic Nur's sign.        Pancreas: Visualized portions are within normal limits.    Right kidney: 12.2 cm. No hydronephrosis.    Ascites: None.    IVC: Visualized portions are within normal limits.    IMPRESSION:     Gallbladder sludge with gallbladder wall thickening. Patient had a CT   abdomen and pelvis, priorto this exam, which is reported separately.    < end of copied text >

## 2018-12-23 NOTE — CONSULT NOTE ADULT - ASSESSMENT
72-year-old male with acute cholecystitis diagnosed since 12/20, left AMA, now returns with worsening abdominal pain. Images reviewed. Follow-up ultrasound images showed increased gallbladder wall edema and cystic duct distension. Patient is currently hemodynamically stable without leukocytosis.    Plan:  - NPO past midnight  - AM labs (CBC, Coags, BMP)  - Continue IV antibiotics.  - Plan for percutaneous cholecystostomy in AM.    Case discussed with Dr. Soliz.

## 2018-12-23 NOTE — H&P ADULT - ASSESSMENT
72M with PMHx of HTN , HLD presenting after signing out AMA from [facility] for furthr management of cholecystitis was seen in ED on 12/20 and diagnosed with acute cholecystitis opted to sign out AMA and go for second opinion , still having RUQ pain and with repeat u/s concerning for possible acute cholecystitis    - Admit to B Team, Dr. Santillan  - CLD  - c/w IV ABx: Cefotetan  - c/w home meds  - IR consulted given inconclusive ultrasound now on representation and lack of other coroborating findings ( no leukocytosis or elevated LFTs) recommended HIDA scan and reconsulting for PCT drain if evidence of acute cholecystitis  - Consult GI for possible ERCP given dilated CBD, MRCP ordered  - DVT ppx  - f/u AM labs    S Macie PGY-2  B Team [id number]

## 2018-12-23 NOTE — ED ADULT TRIAGE NOTE - CHIEF COMPLAINT QUOTE
states" I am having right upper quadrant pain since few days and was at [place] and left AMA to get seen here for cholecystectomy. c/o RUQ pain

## 2018-12-23 NOTE — H&P ADULT - HISTORY OF PRESENT ILLNESS
71 y/o [place] citizen male w/ pmhx HTN, HLD p/w c/o of abdominal pain. Patient was seen in [facility] ED on 12/20 for abdominal pain  Was found to have acute cholecystis on CT A/P at that time. Left AMA at the time for second opinion at [facility] after being dissatisfied with care there signed out AMA today to come to [facility] for eval for cholecystectomy. Per the son, pt was admitted to the surgical service in [facility], made NPO, started on broad spectrum abx and was being evaluated for surgery. Patient endorses intermittent fever/chills, RUQ pain. No diarrhea, nausea, vomiting. Surgery consulted for further management.

## 2018-12-23 NOTE — ED PROVIDER NOTE - PHYSICAL EXAMINATION
Constitutional: NAD, awake and alert  EYES: EOMI  ENT:  Normal Hearing, no tonsillar exudates   Neck: Soft and supple, No JVD  Respiratory: Breath sounds are clear bilaterally, No wheezing, rales or rhonchi  Cardiovascular: S1 and S2, regular rate and rhythm, no Murmurs, gallops or rubs  Gastrointestinal: Bowel Sounds present, soft, TTP of RUQ, no distention  Extremities: No cyanosis or clubbing; warm to touch  Vascular: 2+ peripheral pulses lower ex  Neurological: A/O x 3, no focal deficits  Musculoskeletal: 5/5 strength b/l upper and lower extremities  Skin: No rashes, warm & dry

## 2018-12-23 NOTE — H&P ADULT - NSHPPHYSICALEXAM_GEN_ALL_CORE
Gen: NAD  Resp: breathing comfortably on RA  Cardiac: RRR  GI: soft, nondistended, TTP of RUQ. no rebound or guarding.  Ext: TRINH, warm

## 2018-12-23 NOTE — H&P ADULT - ATTENDING COMMENTS
I saw and examined the patient and agree with the above note.    Acute calculous cholecystitis:  -Long duration of symptoms likely will cause an increasingly challenging operation. I will opt for cholecystostomy tube placement in this case.  -No need for ductal imaging currently  -Abx, resuscitation and labs  -NPO/IVF  -Pain control via IV meds    I spent 65min reviewing data, images and documentation as well as in care coordination.  Greater than 50% of my time was spent in discussion regarding pre- and post-operative care as well as risk and benefits of operative intervention.    Orion Santillan MD  Acute and Critical Cre Surgery    Cell: [phone]  Email: [email or web address]

## 2018-12-23 NOTE — ED ADULT NURSE NOTE - CHIEF COMPLAINT QUOTE
states" I am having right upper quadrant pain since few days and was at [place] and left AMA to get seen here for cholecystectomy. c/o RUQ pain

## 2018-12-23 NOTE — ED PROVIDER NOTE - ATTENDING CONTRIBUTION TO CARE
Attending note:   After face to face evaluation of this patient, I concur with above noted hx, pe, and care plan for this patient.  73 y/o M with cholecystitis diagnosed 4 days ago here at [facility], self referred to [place], opted to return here today instead.   +Persistent ruq pain present, t 99.9R.    evaluation in progress

## 2018-12-24 LAB
ALBUMIN SERPL ELPH-MCNC: 3.5 G/DL — SIGNIFICANT CHANGE UP (ref 3.3–5)
ALP SERPL-CCNC: 68 U/L — SIGNIFICANT CHANGE UP (ref 40–120)
ALT FLD-CCNC: 18 U/L — SIGNIFICANT CHANGE UP (ref 4–41)
APTT BLD: 33.3 SEC — SIGNIFICANT CHANGE UP (ref 27.5–36.3)
AST SERPL-CCNC: 53 U/L — HIGH (ref 4–40)
BILIRUB SERPL-MCNC: 0.5 MG/DL — SIGNIFICANT CHANGE UP (ref 0.2–1.2)
BUN SERPL-MCNC: 12 MG/DL — SIGNIFICANT CHANGE UP (ref 7–23)
CALCIUM SERPL-MCNC: 9.2 MG/DL — SIGNIFICANT CHANGE UP (ref 8.4–10.5)
CHLORIDE SERPL-SCNC: 96 MMOL/L — LOW (ref 98–107)
CO2 SERPL-SCNC: 28 MMOL/L — SIGNIFICANT CHANGE UP (ref 22–31)
CREAT SERPL-MCNC: 0.8 MG/DL — SIGNIFICANT CHANGE UP (ref 0.5–1.3)
GLUCOSE SERPL-MCNC: 103 MG/DL — HIGH (ref 70–99)
GRAM STN WND: SIGNIFICANT CHANGE UP
HCT VFR BLD CALC: 40.8 % — SIGNIFICANT CHANGE UP (ref 39–50)
HGB BLD-MCNC: 14 G/DL — SIGNIFICANT CHANGE UP (ref 13–17)
INR BLD: 1.12 — SIGNIFICANT CHANGE UP (ref 0.88–1.17)
MAGNESIUM SERPL-MCNC: 2 MG/DL — SIGNIFICANT CHANGE UP (ref 1.6–2.6)
MCHC RBC-ENTMCNC: 30 PG — SIGNIFICANT CHANGE UP (ref 27–34)
MCHC RBC-ENTMCNC: 34.3 % — SIGNIFICANT CHANGE UP (ref 32–36)
MCV RBC AUTO: 87.4 FL — SIGNIFICANT CHANGE UP (ref 80–100)
NRBC # FLD: 0 — SIGNIFICANT CHANGE UP
PHOSPHATE SERPL-MCNC: 2.4 MG/DL — LOW (ref 2.5–4.5)
PLATELET # BLD AUTO: 193 K/UL — SIGNIFICANT CHANGE UP (ref 150–400)
PMV BLD: 9.4 FL — SIGNIFICANT CHANGE UP (ref 7–13)
POTASSIUM SERPL-MCNC: 3.3 MMOL/L — LOW (ref 3.5–5.3)
POTASSIUM SERPL-SCNC: 3.3 MMOL/L — LOW (ref 3.5–5.3)
PROT SERPL-MCNC: 6.8 G/DL — SIGNIFICANT CHANGE UP (ref 6–8.3)
PROTHROM AB SERPL-ACNC: 12.5 SEC — SIGNIFICANT CHANGE UP (ref 9.8–13.1)
RBC # BLD: 4.67 M/UL — SIGNIFICANT CHANGE UP (ref 4.2–5.8)
RBC # FLD: 12.8 % — SIGNIFICANT CHANGE UP (ref 10.3–14.5)
SODIUM SERPL-SCNC: 138 MMOL/L — SIGNIFICANT CHANGE UP (ref 135–145)
SPECIMEN SOURCE: SIGNIFICANT CHANGE UP
WBC # BLD: 8.52 K/UL — SIGNIFICANT CHANGE UP (ref 3.8–10.5)
WBC # FLD AUTO: 8.52 K/UL — SIGNIFICANT CHANGE UP (ref 3.8–10.5)

## 2018-12-24 PROCEDURE — 47490 INCISION OF GALLBLADDER: CPT | Mod: GC

## 2018-12-24 PROCEDURE — 99231 SBSQ HOSP IP/OBS SF/LOW 25: CPT | Mod: GC

## 2018-12-24 RX ORDER — HYDROMORPHONE HYDROCHLORIDE 2 MG/ML
0.5 INJECTION INTRAMUSCULAR; INTRAVENOUS; SUBCUTANEOUS EVERY 4 HOURS
Qty: 0 | Refills: 0 | Status: DISCONTINUED | OUTPATIENT
Start: 2018-12-24 | End: 2018-12-26

## 2018-12-24 RX ORDER — ACETAMINOPHEN 500 MG
1000 TABLET ORAL ONCE
Qty: 0 | Refills: 0 | Status: DISCONTINUED | OUTPATIENT
Start: 2018-12-24 | End: 2018-12-26

## 2018-12-24 RX ORDER — ROSUVASTATIN CALCIUM 5 MG/1
20 TABLET ORAL DAILY
Qty: 0 | Refills: 0 | Status: DISCONTINUED | OUTPATIENT
Start: 2018-12-24 | End: 2018-12-26

## 2018-12-24 RX ORDER — HYDROMORPHONE HYDROCHLORIDE 2 MG/ML
0.5 INJECTION INTRAMUSCULAR; INTRAVENOUS; SUBCUTANEOUS ONCE
Qty: 0 | Refills: 0 | Status: DISCONTINUED | OUTPATIENT
Start: 2018-12-24 | End: 2018-12-24

## 2018-12-24 RX ORDER — POTASSIUM PHOSPHATE, MONOBASIC POTASSIUM PHOSPHATE, DIBASIC 236; 224 MG/ML; MG/ML
15 INJECTION, SOLUTION INTRAVENOUS ONCE
Qty: 0 | Refills: 0 | Status: COMPLETED | OUTPATIENT
Start: 2018-12-24 | End: 2018-12-24

## 2018-12-24 RX ORDER — ATORVASTATIN CALCIUM 80 MG/1
40 TABLET, FILM COATED ORAL AT BEDTIME
Qty: 0 | Refills: 0 | Status: DISCONTINUED | OUTPATIENT
Start: 2018-12-24 | End: 2018-12-24

## 2018-12-24 RX ORDER — ENOXAPARIN SODIUM 100 MG/ML
40 INJECTION SUBCUTANEOUS DAILY
Qty: 0 | Refills: 0 | Status: DISCONTINUED | OUTPATIENT
Start: 2018-12-24 | End: 2018-12-26

## 2018-12-24 RX ORDER — ASPIRIN/CALCIUM CARB/MAGNESIUM 324 MG
81 TABLET ORAL DAILY
Qty: 0 | Refills: 0 | Status: DISCONTINUED | OUTPATIENT
Start: 2018-12-24 | End: 2018-12-26

## 2018-12-24 RX ORDER — POTASSIUM CHLORIDE 20 MEQ
10 PACKET (EA) ORAL
Qty: 0 | Refills: 0 | Status: COMPLETED | OUTPATIENT
Start: 2018-12-24 | End: 2018-12-24

## 2018-12-24 RX ORDER — SODIUM CHLORIDE 9 MG/ML
1000 INJECTION, SOLUTION INTRAVENOUS
Qty: 0 | Refills: 0 | Status: DISCONTINUED | OUTPATIENT
Start: 2018-12-24 | End: 2018-12-25

## 2018-12-24 RX ORDER — HYDROMORPHONE HYDROCHLORIDE 2 MG/ML
1 INJECTION INTRAMUSCULAR; INTRAVENOUS; SUBCUTANEOUS EVERY 4 HOURS
Qty: 0 | Refills: 0 | Status: DISCONTINUED | OUTPATIENT
Start: 2018-12-24 | End: 2018-12-26

## 2018-12-24 RX ADMIN — ENOXAPARIN SODIUM 40 MILLIGRAM(S): 100 INJECTION SUBCUTANEOUS at 19:44

## 2018-12-24 RX ADMIN — POTASSIUM PHOSPHATE, MONOBASIC POTASSIUM PHOSPHATE, DIBASIC 62.5 MILLIMOLE(S): 236; 224 INJECTION, SOLUTION INTRAVENOUS at 19:39

## 2018-12-24 RX ADMIN — Medication 100 MILLIEQUIVALENT(S): at 16:16

## 2018-12-24 RX ADMIN — Medication 100 MILLIEQUIVALENT(S): at 12:45

## 2018-12-24 RX ADMIN — HYDROMORPHONE HYDROCHLORIDE 1 MILLIGRAM(S): 2 INJECTION INTRAMUSCULAR; INTRAVENOUS; SUBCUTANEOUS at 15:15

## 2018-12-24 RX ADMIN — HYDROMORPHONE HYDROCHLORIDE 1 MILLIGRAM(S): 2 INJECTION INTRAMUSCULAR; INTRAVENOUS; SUBCUTANEOUS at 14:53

## 2018-12-24 RX ADMIN — SODIUM CHLORIDE 100 MILLILITER(S): 9 INJECTION, SOLUTION INTRAVENOUS at 14:53

## 2018-12-24 RX ADMIN — SODIUM CHLORIDE 100 MILLILITER(S): 9 INJECTION, SOLUTION INTRAVENOUS at 00:51

## 2018-12-24 RX ADMIN — Medication 100 GRAM(S): at 05:45

## 2018-12-24 RX ADMIN — Medication 100 MILLIEQUIVALENT(S): at 11:48

## 2018-12-24 RX ADMIN — LISINOPRIL 50 MILLIGRAM(S): 2.5 TABLET ORAL at 05:45

## 2018-12-24 RX ADMIN — SODIUM CHLORIDE 100 MILLILITER(S): 9 INJECTION, SOLUTION INTRAVENOUS at 19:39

## 2018-12-24 RX ADMIN — Medication 100 GRAM(S): at 18:34

## 2018-12-24 RX ADMIN — SODIUM CHLORIDE 100 MILLILITER(S): 9 INJECTION, SOLUTION INTRAVENOUS at 16:16

## 2018-12-24 RX ADMIN — ROSUVASTATIN CALCIUM 20 MILLIGRAM(S): 5 TABLET ORAL at 19:43

## 2018-12-24 NOTE — CHART NOTE - NSCHARTNOTEFT_GEN_A_CORE
POST-OPERATIVE NOTE    Subjective:  Patient is s/p PTC tube. Pain well controlled. Denies nausea or vomiting. Recovering appropriately.     Vital Signs Last 24 Hrs  T(C): 37.2 (24 Dec 2018 15:14), Max: 37.3 (24 Dec 2018 05:42)  T(F): 99 (24 Dec 2018 15:14), Max: 99.1 (24 Dec 2018 05:42)  HR: 70 (24 Dec 2018 15:14) (68 - 81)  BP: 147/84 (24 Dec 2018 15:14) (127/61 - 156/68)  BP(mean): --  RR: 18 (24 Dec 2018 15:14) (17 - 18)  SpO2: 98% (24 Dec 2018 15:14) (95% - 100%)  I&O's Detail    23 Dec 2018 07:01  -  24 Dec 2018 07:00  --------------------------------------------------------  IN:    IV PiggyBack: 50 mL    lactated ringers.: 600 mL  Total IN: 650 mL    OUT:    Voided: 175 mL  Total OUT: 175 mL    Total NET: 475 mL        cefoTEtan  IVPB   cefoTEtan  IVPB 2  aspirin enteric coated 81  cefoTEtan  IVPB   cefoTEtan  IVPB 2  enoxaparin Injectable 40  lisinopril 50    PAST MEDICAL & SURGICAL HISTORY:  Essential hypertension  High cholesterol  No significant past surgical history        Physical Exam:  General: NAD, resting comfortably in bed  Pulmonary: Nonlabored breathing, no respiratory distress  Cardiovascular: NSR  Abdominal: soft, mild TTP in RUQ, ND,  PTC drain in place with bilious output.   Extremities: WWP      LABS:                        14.0   8.52  )-----------( 193      ( 24 Dec 2018 06:20 )             40.8     12-24    138  |  96<L>  |  12  ----------------------------<  103<H>  3.3<L>   |  28  |  0.80    Ca    9.2      24 Dec 2018 06:20  Phos  2.4     12-24  Mg     2.0     12-24    TPro  6.8  /  Alb  3.5  /  TBili  0.5  /  DBili  x   /  AST  53<H>  /  ALT  18  /  AlkPhos  68  12-24    PT/INR - ( 24 Dec 2018 06:20 )   PT: 12.5 SEC;   INR: 1.12          PTT - ( 24 Dec 2018 06:20 )  PTT:33.3 SEC  CAPILLARY BLOOD GLUCOSE          Radiology and Additional Studies:    Assessment:  The patient is a 73y Male who is now several hours post-op from a PTC tube placement     Plan:  - Pain control as needed  - DVT ppx  - OOB and ambulating as tolerated  - F/u AM labs

## 2018-12-24 NOTE — PRE-OP CHECKLIST - PATIENT'S PERSONAL PROPERTY REMOVED
July 27, 2017        Nitesh Duron   [address]  [facility] [address]        Dear Nitesh:    Your cardiologist Dr. Cano has reviewed your echocardiogram from 7/12/2017. There were no concerning findings.    If you have any questions or concerns, please feel free to call the office at [phone].        Sincerely,            [facility]     
glasses on pt

## 2018-12-25 LAB
ALBUMIN SERPL ELPH-MCNC: 3.1 G/DL — LOW (ref 3.3–5)
ALP SERPL-CCNC: 62 U/L — SIGNIFICANT CHANGE UP (ref 40–120)
ALT FLD-CCNC: 24 U/L — SIGNIFICANT CHANGE UP (ref 4–41)
AST SERPL-CCNC: 50 U/L — HIGH (ref 4–40)
BILIRUB SERPL-MCNC: 0.4 MG/DL — SIGNIFICANT CHANGE UP (ref 0.2–1.2)
BUN SERPL-MCNC: 12 MG/DL — SIGNIFICANT CHANGE UP (ref 7–23)
CALCIUM SERPL-MCNC: 9.1 MG/DL — SIGNIFICANT CHANGE UP (ref 8.4–10.5)
CHLORIDE SERPL-SCNC: 97 MMOL/L — LOW (ref 98–107)
CO2 SERPL-SCNC: 28 MMOL/L — SIGNIFICANT CHANGE UP (ref 22–31)
CREAT SERPL-MCNC: 0.85 MG/DL — SIGNIFICANT CHANGE UP (ref 0.5–1.3)
GLUCOSE SERPL-MCNC: 74 MG/DL — SIGNIFICANT CHANGE UP (ref 70–99)
HCT VFR BLD CALC: 39.7 % — SIGNIFICANT CHANGE UP (ref 39–50)
HGB BLD-MCNC: 13.5 G/DL — SIGNIFICANT CHANGE UP (ref 13–17)
MAGNESIUM SERPL-MCNC: 1.8 MG/DL — SIGNIFICANT CHANGE UP (ref 1.6–2.6)
MCHC RBC-ENTMCNC: 29.5 PG — SIGNIFICANT CHANGE UP (ref 27–34)
MCHC RBC-ENTMCNC: 34 % — SIGNIFICANT CHANGE UP (ref 32–36)
MCV RBC AUTO: 86.9 FL — SIGNIFICANT CHANGE UP (ref 80–100)
NRBC # FLD: 0 — SIGNIFICANT CHANGE UP
PHOSPHATE SERPL-MCNC: 3.7 MG/DL — SIGNIFICANT CHANGE UP (ref 2.5–4.5)
PLATELET # BLD AUTO: 201 K/UL — SIGNIFICANT CHANGE UP (ref 150–400)
PMV BLD: 9.3 FL — SIGNIFICANT CHANGE UP (ref 7–13)
POTASSIUM SERPL-MCNC: 3.7 MMOL/L — SIGNIFICANT CHANGE UP (ref 3.5–5.3)
POTASSIUM SERPL-SCNC: 3.7 MMOL/L — SIGNIFICANT CHANGE UP (ref 3.5–5.3)
PROT SERPL-MCNC: 6.3 G/DL — SIGNIFICANT CHANGE UP (ref 6–8.3)
RBC # BLD: 4.57 M/UL — SIGNIFICANT CHANGE UP (ref 4.2–5.8)
RBC # FLD: 12.7 % — SIGNIFICANT CHANGE UP (ref 10.3–14.5)
SODIUM SERPL-SCNC: 138 MMOL/L — SIGNIFICANT CHANGE UP (ref 135–145)
WBC # BLD: 5.96 K/UL — SIGNIFICANT CHANGE UP (ref 3.8–10.5)
WBC # FLD AUTO: 5.96 K/UL — SIGNIFICANT CHANGE UP (ref 3.8–10.5)

## 2018-12-25 RX ORDER — POTASSIUM CHLORIDE 20 MEQ
10 PACKET (EA) ORAL
Qty: 0 | Refills: 0 | Status: COMPLETED | OUTPATIENT
Start: 2018-12-25 | End: 2018-12-25

## 2018-12-25 RX ORDER — MAGNESIUM SULFATE 500 MG/ML
1 VIAL (ML) INJECTION ONCE
Qty: 0 | Refills: 0 | Status: COMPLETED | OUTPATIENT
Start: 2018-12-25 | End: 2018-12-25

## 2018-12-25 RX ADMIN — Medication 100 MILLIEQUIVALENT(S): at 15:16

## 2018-12-25 RX ADMIN — HYDROMORPHONE HYDROCHLORIDE 0.5 MILLIGRAM(S): 2 INJECTION INTRAMUSCULAR; INTRAVENOUS; SUBCUTANEOUS at 00:15

## 2018-12-25 RX ADMIN — Medication 100 GRAM(S): at 05:13

## 2018-12-25 RX ADMIN — Medication 100 GRAM(S): at 17:52

## 2018-12-25 RX ADMIN — Medication 100 GRAM(S): at 11:48

## 2018-12-25 RX ADMIN — ROSUVASTATIN CALCIUM 20 MILLIGRAM(S): 5 TABLET ORAL at 21:58

## 2018-12-25 RX ADMIN — Medication 100 MILLIEQUIVALENT(S): at 14:00

## 2018-12-25 RX ADMIN — SODIUM CHLORIDE 100 MILLILITER(S): 9 INJECTION, SOLUTION INTRAVENOUS at 05:13

## 2018-12-25 RX ADMIN — ENOXAPARIN SODIUM 40 MILLIGRAM(S): 100 INJECTION SUBCUTANEOUS at 11:46

## 2018-12-25 RX ADMIN — LISINOPRIL 50 MILLIGRAM(S): 2.5 TABLET ORAL at 05:13

## 2018-12-25 RX ADMIN — Medication 81 MILLIGRAM(S): at 11:47

## 2018-12-25 RX ADMIN — HYDROMORPHONE HYDROCHLORIDE 0.5 MILLIGRAM(S): 2 INJECTION INTRAMUSCULAR; INTRAVENOUS; SUBCUTANEOUS at 00:45

## 2018-12-25 RX ADMIN — Medication 100 MILLIEQUIVALENT(S): at 16:31

## 2018-12-25 NOTE — PROVIDER CONTACT NOTE (OTHER) - BACKGROUND
PT admitted for acute veda, s/p IR drainage. Right IR drain in place with brown output. PT received lisinopril in AM.

## 2018-12-25 NOTE — PROVIDER CONTACT NOTE (OTHER) - ACTION/TREATMENT ORDERED:
MD aware of elevated BP. No actions at this time. Continue to closely monitor and notify MD if SBP > 170.

## 2018-12-26 ENCOUNTER — TRANSCRIPTION ENCOUNTER (OUTPATIENT)
Age: 73
End: 2018-12-26

## 2018-12-26 VITALS
SYSTOLIC BLOOD PRESSURE: 140 MMHG | DIASTOLIC BLOOD PRESSURE: 85 MMHG | RESPIRATION RATE: 20 BRPM | HEART RATE: 67 BPM | OXYGEN SATURATION: 95 %

## 2018-12-26 LAB
-  AMIKACIN: SIGNIFICANT CHANGE UP
-  AMIKACIN: SIGNIFICANT CHANGE UP
-  AMPICILLIN/SULBACTAM: SIGNIFICANT CHANGE UP
-  AMPICILLIN/SULBACTAM: SIGNIFICANT CHANGE UP
-  AMPICILLIN: SIGNIFICANT CHANGE UP
-  AMPICILLIN: SIGNIFICANT CHANGE UP
-  AZTREONAM: SIGNIFICANT CHANGE UP
-  AZTREONAM: SIGNIFICANT CHANGE UP
-  CEFAZOLIN: SIGNIFICANT CHANGE UP
-  CEFAZOLIN: SIGNIFICANT CHANGE UP
-  CEFEPIME: SIGNIFICANT CHANGE UP
-  CEFEPIME: SIGNIFICANT CHANGE UP
-  CEFOXITIN: SIGNIFICANT CHANGE UP
-  CEFOXITIN: SIGNIFICANT CHANGE UP
-  CEFTAZIDIME: SIGNIFICANT CHANGE UP
-  CEFTAZIDIME: SIGNIFICANT CHANGE UP
-  CEFTRIAXONE: SIGNIFICANT CHANGE UP
-  CEFTRIAXONE: SIGNIFICANT CHANGE UP
-  CIPROFLOXACIN: SIGNIFICANT CHANGE UP
-  CIPROFLOXACIN: SIGNIFICANT CHANGE UP
-  ERTAPENEM: SIGNIFICANT CHANGE UP
-  ERTAPENEM: SIGNIFICANT CHANGE UP
-  GENTAMICIN: SIGNIFICANT CHANGE UP
-  GENTAMICIN: SIGNIFICANT CHANGE UP
-  IMIPENEM: SIGNIFICANT CHANGE UP
-  IMIPENEM: SIGNIFICANT CHANGE UP
-  LEVOFLOXACIN: SIGNIFICANT CHANGE UP
-  LEVOFLOXACIN: SIGNIFICANT CHANGE UP
-  MEROPENEM: SIGNIFICANT CHANGE UP
-  MEROPENEM: SIGNIFICANT CHANGE UP
-  PIPERACILLIN/TAZOBACTAM: SIGNIFICANT CHANGE UP
-  PIPERACILLIN/TAZOBACTAM: SIGNIFICANT CHANGE UP
-  TIGECYCLINE: SIGNIFICANT CHANGE UP
-  TIGECYCLINE: SIGNIFICANT CHANGE UP
-  TOBRAMYCIN: SIGNIFICANT CHANGE UP
-  TOBRAMYCIN: SIGNIFICANT CHANGE UP
-  TRIMETHOPRIM/SULFAMETHOXAZOLE: SIGNIFICANT CHANGE UP
-  TRIMETHOPRIM/SULFAMETHOXAZOLE: SIGNIFICANT CHANGE UP
CULTURE - SURGICAL SITE: SIGNIFICANT CHANGE UP
GRAM STN WND: SIGNIFICANT CHANGE UP
METHOD TYPE: SIGNIFICANT CHANGE UP
METHOD TYPE: SIGNIFICANT CHANGE UP
ORGANISM # SPEC MICROSCOPIC CNT: SIGNIFICANT CHANGE UP

## 2018-12-26 RX ORDER — ROSUVASTATIN CALCIUM 5 MG/1
1 TABLET ORAL
Qty: 0 | Refills: 0 | COMMUNITY
Start: 2018-12-26

## 2018-12-26 RX ORDER — OXYCODONE HYDROCHLORIDE 5 MG/1
5 TABLET ORAL EVERY 4 HOURS
Qty: 0 | Refills: 0 | Status: DISCONTINUED | OUTPATIENT
Start: 2018-12-26 | End: 2018-12-26

## 2018-12-26 RX ORDER — LOSARTAN POTASSIUM 100 MG/1
1 TABLET, FILM COATED ORAL
Qty: 0 | Refills: 0 | COMMUNITY
Start: 2018-12-26

## 2018-12-26 RX ORDER — ASPIRIN/CALCIUM CARB/MAGNESIUM 324 MG
1 TABLET ORAL
Qty: 0 | Refills: 0 | COMMUNITY
Start: 2018-12-26

## 2018-12-26 RX ORDER — AZTREONAM 2 G
1 VIAL (EA) INJECTION
Qty: 14 | Refills: 0 | OUTPATIENT
Start: 2018-12-26 | End: 2019-01-01

## 2018-12-26 RX ORDER — ACETAMINOPHEN 500 MG
650 TABLET ORAL EVERY 6 HOURS
Qty: 0 | Refills: 0 | Status: DISCONTINUED | OUTPATIENT
Start: 2018-12-26 | End: 2018-12-26

## 2018-12-26 RX ORDER — ACETAMINOPHEN 500 MG
2 TABLET ORAL
Qty: 0 | Refills: 0 | COMMUNITY
Start: 2018-12-26

## 2018-12-26 RX ORDER — LOSARTAN POTASSIUM 100 MG/1
50 TABLET, FILM COATED ORAL DAILY
Qty: 0 | Refills: 0 | Status: DISCONTINUED | OUTPATIENT
Start: 2018-12-26 | End: 2018-12-26

## 2018-12-26 RX ADMIN — ENOXAPARIN SODIUM 40 MILLIGRAM(S): 100 INJECTION SUBCUTANEOUS at 11:43

## 2018-12-26 RX ADMIN — HYDROMORPHONE HYDROCHLORIDE 0.5 MILLIGRAM(S): 2 INJECTION INTRAMUSCULAR; INTRAVENOUS; SUBCUTANEOUS at 07:58

## 2018-12-26 RX ADMIN — Medication 100 GRAM(S): at 05:07

## 2018-12-26 RX ADMIN — Medication 81 MILLIGRAM(S): at 11:43

## 2018-12-26 RX ADMIN — HYDROMORPHONE HYDROCHLORIDE 0.5 MILLIGRAM(S): 2 INJECTION INTRAMUSCULAR; INTRAVENOUS; SUBCUTANEOUS at 08:12

## 2018-12-26 RX ADMIN — LOSARTAN POTASSIUM 50 MILLIGRAM(S): 100 TABLET, FILM COATED ORAL at 07:32

## 2018-12-26 NOTE — DISCHARGE NOTE ADULT - CARE PROVIDER_API CALL
Orion Santillan), Surgery; Surgical Critical Care  [address] [place] [address]  Phone: [phone]  Fax: [phone]    Gabriel Soliz), Interventional Radiology  [address] [place] [address]  Phone: [phone]  Fax: [phone]

## 2018-12-26 NOTE — DISCHARGE NOTE ADULT - MEDICATION SUMMARY - MEDICATIONS TO TAKE
I will START or STAY ON the medications listed below when I get home from the hospital:    acetaminophen 325 mg oral tablet  -- 2 tab(s) by mouth every 6 hours, As needed, Mild Pain (1 - 3)  -- Indication: For mild pain    aspirin 81 mg oral delayed release tablet  -- 1 tab(s) by mouth once a day  -- Indication: For antiplatelet    losartan 50 mg oral tablet  -- 1 tab(s) by mouth once a day  -- Indication: For HTN/Home medication    rosuvastatin 20 mg oral tablet  -- 1 tab(s) by mouth once a day  -- Indication: For HLD    amoxicillin-clavulanate 875 mg-125 mg oral tablet  -- 1 tab(s) by mouth 2 times a day MDD:2 tabs  -- Indication: For Antibiotic I will START or STAY ON the medications listed below when I get home from the hospital:    acetaminophen 325 mg oral tablet  -- 2 tab(s) by mouth every 6 hours, As needed, Mild Pain (1 - 3)  -- Indication: For mild pain    aspirin 81 mg oral delayed release tablet  -- 1 tab(s) by mouth once a day  -- Indication: For antiplatelet    losartan 50 mg oral tablet  -- 1 tab(s) by mouth once a day  -- Indication: For HTN/Home medication    rosuvastatin 20 mg oral tablet  -- 1 tab(s) by mouth once a day  -- Indication: For HLD    Bactrim  mg-160 mg oral tablet  -- 1 tab(s) by mouth 2 times a day MDD:2 tabs  -- Avoid prolonged or excessive exposure to direct and/or artificial sunlight while taking this medication.  Finish all this medication unless otherwise directed by prescriber.  Medication should be taken with plenty of water.    -- Indication: For antibiotic

## 2018-12-26 NOTE — DISCHARGE NOTE ADULT - HOSPITAL COURSE
73 y/o [place] male w/ pmhx HTN, HLD p/w c/o of abdominal pain. Patient was seen in [facility] ED on 12/20 for abdominal pain  Was found to have acute cholecystis on CT A/P at that time. Left AMA at the time for second opinion at [facility] after being dissatisfied with care there signed out AMA today to come to [facility] for eval for cholecystectomy. Per the son, pt was admitted to the surgical service in [facility], made NPO, started on broad spectrum abx and was being evaluated for surgery. Patient endorses intermittent fever/chills, RUQ pain. No diarrhea, nausea, vomiting. Surgery consulted for further management.     CT abdomen and pelvis revealed Acute cholecystitis. A 4 mm stone within the cystic duct. Abdominal US revealed Gallbladder sludge with gallbladder wall thickening. MRCP revealed Acute cholecystitis. No biliary dilatation.    Patient taken to interventional radiology 12/24 for percutaneous cholecystostomy tube. Cultures sent. Drain teaching provided.     During hospital course patients diet was slowly advanced as tolerated.  At this time, pt is tolerating a regular diet, ambulating and voiding.  Pt is stable for discharge as per the attending at this time. Patient instructed to follow up with surgeon for interval cholecystectomy in 4-6 weeks as well as interventional radiology within 2 weeks for tube check. 73 y/o [place] male w/ pmhx HTN, HLD p/w c/o of abdominal pain. Patient was seen in [facility] ED on 12/20 for abdominal pain  Was found to have acute cholecystis on CT A/P at that time. Left AMA at the time for second opinion at [facility] after being dissatisfied with care there signed out AMA today to come to [facility] for eval for cholecystectomy. Per the son, pt was admitted to the surgical service in [facility], made NPO, started on broad spectrum abx and was being evaluated for surgery. Patient endorses intermittent fever/chills, RUQ pain. No diarrhea, nausea, vomiting. Surgery consulted for further management.     CT abdomen and pelvis revealed Acute cholecystitis. A 4 mm stone within the cystic duct. Abdominal US revealed Gallbladder sludge with gallbladder wall thickening. MRCP revealed Acute cholecystitis. No biliary dilatation.    Patient taken to interventional radiology 12/24 for percutaneous cholecystostomy tube. Cultures sent. Drain teaching provided.     During hospital course patients diet was slowly advanced as tolerated.  At this time, pt is tolerating a regular diet, ambulating and voiding.  Pt is stable for discharge as per the attending at this time with oral antibiotic. Patient instructed to follow up with surgeon for interval cholecystectomy in 4-6 weeks as well as interventional radiology within 2 weeks for tube check.

## 2018-12-26 NOTE — DISCHARGE NOTE ADULT - CARE PROVIDERS DIRECT ADDRESSES
,[email or web address],[email or web address]

## 2018-12-26 NOTE — PROGRESS NOTE ADULT - SUBJECTIVE AND OBJECTIVE BOX
Surgery Progress Note    S: Patient seen and examined. No acute events overnight. Pain well controlled. Denies nausea or vomiting. NPO, planned for IR perc veda tube today.     O:  Vital Signs Last 24 Hrs  T(C): 36.8 (24 Dec 2018 07:35), Max: 37.7 (23 Dec 2018 15:01)  T(F): 98.3 (24 Dec 2018 07:35), Max: 99.9 (23 Dec 2018 15:01)  HR: 75 (24 Dec 2018 07:35) (68 - 81)  BP: 156/68 (24 Dec 2018 07:35) (127/61 - 156/68)  BP(mean): --  RR: 18 (24 Dec 2018 07:35) (17 - 18)  SpO2: 95% (24 Dec 2018 07:35) (95% - 100%)    I&O's Detail    23 Dec 2018 07:01  -  24 Dec 2018 07:00  --------------------------------------------------------  IN:    IV PiggyBack: 50 mL    lactated ringers.: 600 mL  Total IN: 650 mL    OUT:    Voided: 175 mL  Total OUT: 175 mL    Total NET: 475 mL          MEDICATIONS  (STANDING):  aspirin enteric coated 81 milliGRAM(s) Oral daily  atorvastatin 40 milliGRAM(s) Oral at bedtime  cefoTEtan  IVPB      cefoTEtan  IVPB 2 Gram(s) IV Intermittent every 12 hours  enoxaparin Injectable 40 milliGRAM(s) SubCutaneous daily  lactated ringers. 1000 milliLiter(s) (100 mL/Hr) IV Continuous <Continuous>  lisinopril 50 milliGRAM(s) Oral daily    MEDICATIONS  (PRN):                            14.0   8.52  )-----------( 193      ( 24 Dec 2018 06:20 )             40.8       12-24    138  |  96<L>  |  12  ----------------------------<  103<H>  3.3<L>   |  28  |  0.80    Ca    9.2      24 Dec 2018 06:20  Phos  2.4     12-24  Mg     2.0     12-24    TPro  6.8  /  Alb  3.5  /  TBili  0.5  /  DBili  x   /  AST  53<H>  /  ALT  18  /  AlkPhos  68  12-24      Physical Exam:  Gen: Laying in bed, NAD  Resp: Unlabored breathing  Abd: soft, TTP in RUQ, ND   Ext: WWP  Skin: No rashes
Interventional Radiology Brief- Operative Note    Procedure: Perc veda    Operators: Woo Padilla M.D.    Anesthesia (type): MAC, local    Contrast: 5 cc    EBL: minimal    Findings/Follow up Plan of Care: perc veda tube placement    Specimens Removed: bile for culture and gram stain    Implants: 8.5 fr multi-purpose catheter    Complications: none    Condition/Disposition: IRS and then floor    Please call Interventional Radiology with any questions, concerns, or issues.
Surgery Progress Note    S: Patient seen and examined. No acute events overnight. Pain well controlled. Denies nausea or vomiting. Reports he feels better after the procedure.    O:  Vital Signs Last 24 Hrs  T(C): 37 (25 Dec 2018 10:46), Max: 37.3 (24 Dec 2018 21:15)  T(F): 98.6 (25 Dec 2018 10:46), Max: 99.1 (24 Dec 2018 21:15)  HR: 66 (25 Dec 2018 10:46) (63 - 71)  BP: 154/82 (25 Dec 2018 10:46) (128/62 - 160/80)  BP(mean): --  RR: 16 (25 Dec 2018 10:46) (16 - 20)  SpO2: 99% (25 Dec 2018 10:46) (97% - 100%)    I&O's Detail    24 Dec 2018 07:01  -  25 Dec 2018 07:00  --------------------------------------------------------  IN:    IV PiggyBack: 50 mL    lactated ringers.: 2000 mL  Total IN: 2050 mL    OUT:    Drain: 275 mL  Total OUT: 275 mL    Total NET: 1775 mL      25 Dec 2018 07:01  -  25 Dec 2018 13:05  --------------------------------------------------------  IN:    IV PiggyBack: 100 mL  Total IN: 100 mL    OUT:    Drain: 35 mL    Voided: 300 mL  Total OUT: 335 mL    Total NET: -235 mL            MEDICATIONS  (STANDING):  aspirin enteric coated 81 milliGRAM(s) Oral daily  atorvastatin 40 milliGRAM(s) Oral at bedtime  cefoTEtan  IVPB      cefoTEtan  IVPB 2 Gram(s) IV Intermittent every 12 hours  enoxaparin Injectable 40 milliGRAM(s) SubCutaneous daily  lactated ringers. 1000 milliLiter(s) (100 mL/Hr) IV Continuous <Continuous>  lisinopril 50 milliGRAM(s) Oral daily    MEDICATIONS  (PRN):      Physical Exam:  Gen: Laying in bed, NAD  Resp: Unlabored breathing  Abd: soft, slightly TTP in RUQ, ND - PTC drain intact, bilious output present  Ext: WWP  Skin: No rashes                          13.5   5.96  )-----------( 201      ( 25 Dec 2018 05:45 )             39.7     12-25    138  |  97<L>  |  12  ----------------------------<  74  3.7   |  28  |  0.85    Ca    9.1      25 Dec 2018 05:45  Phos  3.7     12-25  Mg     1.8     12-25    TPro  6.3  /  Alb  3.1<L>  /  TBili  0.4  /  DBili  x   /  AST  50<H>  /  ALT  24  /  AlkPhos  62  12-25
Surgery Progress Note    S: Patient seen and examined. No acute events overnight. Pain well controlled. Tolerating diet, denies nausea or vomiting.     O:  Vital Signs Last 24 Hrs  T(C): 37.1 (26 Dec 2018 05:04), Max: 37.1 (25 Dec 2018 17:50)  T(F): 98.7 (26 Dec 2018 05:04), Max: 98.8 (25 Dec 2018 17:50)  HR: 67 (26 Dec 2018 08:08) (62 - 70)  BP: 140/85 (26 Dec 2018 08:08) (140/85 - 163/81)  BP(mean): --  RR: 20 (26 Dec 2018 08:08) (18 - 20)  SpO2: 95% (26 Dec 2018 08:08) (95% - 100%)    I&O's Detail    25 Dec 2018 07:01  -  26 Dec 2018 07:00  --------------------------------------------------------  IN:    IV PiggyBack: 500 mL    Oral Fluid: 240 mL  Total IN: 740 mL    OUT:    Drain: 108 mL    Voided: 300 mL  Total OUT: 408 mL    Total NET: 332 mL      26 Dec 2018 07:01  -  26 Dec 2018 11:25  --------------------------------------------------------  IN:  Total IN: 0 mL    OUT:    Drain: 20 mL  Total OUT: 20 mL    Total NET: -20 mL          MEDICATIONS  (STANDING):  amoxicillin  875 milliGRAM(s)/clavulanate 1 Tablet(s) Oral two times a day  aspirin enteric coated 81 milliGRAM(s) Oral daily  enoxaparin Injectable 40 milliGRAM(s) SubCutaneous daily  losartan 50 milliGRAM(s) Oral daily  rosuvastatin 20 milliGRAM(s) Oral daily    MEDICATIONS  (PRN):  acetaminophen   Tablet .. 650 milliGRAM(s) Oral every 6 hours PRN Mild Pain (1 - 3)                            13.5   5.96  )-----------( 201      ( 25 Dec 2018 05:45 )             39.7       12-25    138  |  97<L>  |  12  ----------------------------<  74  3.7   |  28  |  0.85    Ca    9.1      25 Dec 2018 05:45  Phos  3.7     12-25  Mg     1.8     12-25    TPro  6.3  /  Alb  3.1<L>  /  TBili  0.4  /  DBili  x   /  AST  50<H>  /  ALT  24  /  AlkPhos  62  12-25      Physical Exam:  Gen: Laying in bed, NAD  Resp: Unlabored breathing  Abd: soft, NT, ND - PTC drain intact, bilious output present  Ext: WWP  Skin: No rashes

## 2018-12-26 NOTE — DISCHARGE NOTE ADULT - CARE PLAN
Principal Discharge DX:	Cholecystitis  Goal:	You had percutaneous cholecystostomy tube placement by interventional radiology  Assessment and plan of treatment:	WOUND CARE:  Please take care of drain as taught to you. Record drain output daily. Please follow up with interventional radiology within 2 weeks of discharge for tube check. Continue to take oral antibiotic as prescribed.   BATHING: Please do not submerge wound underwater. You may shower and/or sponge bathe.  ACTIVITY: No heavy lifting or straining. Otherwise, you may return to your usual level of physical activity. If you are taking narcotic pain medication (such as Percocet) DO NOT drive a car, operate machinery or make important decisions.  DIET: Return to your usual diet.  NOTIFY YOUR SURGEON IF: You have any bleeding that does not stop, any pus draining from your wound(s), any fever (over 100.4 F) or chills, persistent nausea/vomiting, persistent diarrhea, or if your pain is not controlled on your discharge pain medications.  FOLLOW-UP: Please follow up with your primary care physician in one week regarding your hospitalization  Please follow up with interventional radiology within 2 weeks of discharge for tube check. Bring record of outputs to office. [phone]  Please follow up with surgeon, Dr. Santillan within one week of discharge for planning of interval cholecystectomy.  Secondary Diagnosis:	Essential hypertension  Goal:	Please follow with primary medical doctor  Secondary Diagnosis:	High cholesterol  Goal:	Please follow with primary medical doctor

## 2018-12-26 NOTE — DISCHARGE NOTE ADULT - PLAN OF CARE
You had percutaneous cholecystostomy tube placement by interventional radiology WOUND CARE:  Please take care of drain as taught to you. Record drain output daily. Please follow up with interventional radiology within 2 weeks of discharge for tube check. Continue to take oral antibiotic as prescribed.   BATHING: Please do not submerge wound underwater. You may shower and/or sponge bathe.  ACTIVITY: No heavy lifting or straining. Otherwise, you may return to your usual level of physical activity. If you are taking narcotic pain medication (such as Percocet) DO NOT drive a car, operate machinery or make important decisions.  DIET: Return to your usual diet.  NOTIFY YOUR SURGEON IF: You have any bleeding that does not stop, any pus draining from your wound(s), any fever (over 100.4 F) or chills, persistent nausea/vomiting, persistent diarrhea, or if your pain is not controlled on your discharge pain medications.  FOLLOW-UP: Please follow up with your primary care physician in one week regarding your hospitalization  Please follow up with interventional radiology within 2 weeks of discharge for tube check. Bring record of outputs to office. [phone]  Please follow up with surgeon, Dr. Santillan within one week of discharge for planning of interval cholecystectomy. Please follow with primary medical doctor

## 2018-12-26 NOTE — PROGRESS NOTE ADULT - ASSESSMENT
72M with PMHx of HTN , HLD presenting after signing out AMA from [facility] for furthr management of cholecystitis was seen in ED on 12/20 and diagnosed with acute cholecystitis opted to sign out AMA and go for second opinion , still having RUQ pain and with repeat u/s concerning for possible acute cholecystitis    - NPO  - c/w IV ABx: Cefotetan  - c/w home meds  - IR this AM for PTC tube   - Consult GI for possible ERCP given dilated CBD, MRCP ordered  - DVT ppx  - f/u AM labs      B Team [id number]
72M with PMHx of HTN , HLD presenting after signing out AMA from [facility] for further management of cholecystitis was seen in ED on 12/20 and diagnosed with acute cholecystitis opted to sign out AMA and go for second opinion , still having RUQ pain and with repeat u/s concerning for acute cholecystitis.  Now s/p PTC drain with IR (12/24).    - Regular diet   - c/w IV ABx: Cefotetan  - c/w home meds  - DVT ppx
72M with PMHx of HTN , HLD presenting after signing out AMA from [facility] for further management of cholecystitis was seen in ED on 12/20 and diagnosed with acute cholecystitis opted to sign out AMA and go for second opinion , still having RUQ pain and with repeat u/s concerning for acute cholecystitis.  Now s/p PTC drain with IR (12/24).    - Regular diet   - c/w IV ABx: Cefotetan  - c/w home meds  - DVT ppx

## 2018-12-26 NOTE — DISCHARGE NOTE ADULT - ADDITIONAL INSTRUCTIONS
Please follow up with surgeon, Dr. Santillan within one week of discharge. You will need  interval cholecystectomy in 4-6 weeks.  Please follow up with interventional; radiology within 2 weeks of discharge for tube check, call [phone] for tube check.

## 2018-12-26 NOTE — DISCHARGE NOTE ADULT - PATIENT PORTAL LINK FT
You can access the [facility] Patient Portal, offered by [facility], by registering with the following website: [email or web address]

## 2018-12-26 NOTE — PROGRESS NOTE ADULT - ATTENDING COMMENTS
I have reviewed his progress since yesterday, pertinent labs and imaging (MRCP), and discussed the care with the B team residents.  Now s/p perc veda tube decompression.  See adjacent house officer's note for details.    The active issues are:  1.  advanced acute cholecystitis, improving with iv antibx and drainage procedure    Check GS and sensitivities of bile sample and plan for an interval lap CCY after 6-8 weeks.    The Acute Care Surgery (B Team) Attending Group Practice:  Dr. Nash Cleveland, Dr. Gómez Lindsey, Dr. Nabeel Rincon, Dr. Teresa Mcgraw, Dr. Orion Santillan    urgent issues - spectra [phone] or [phone]  nonurgent issues - [phone]  patient appointments or afterhours - [phone]
acute cholecystitis, out of the safe window for a lap CCY per [place] guidelines, will manage with drainage and plan for interval laparoscopic cholecystectomy after 6-8 weeks to ensure inflammation subsides before proceeding with operative intervention
discharge home on oral antibx and drain care  hopefully interval lap veda in 6-8 wks

## 2018-12-26 NOTE — DISCHARGE NOTE ADULT - HOME CARE AGENCY
[facility]  homecare [phone]   RN  to call   to schedule home visit 12/127 for drain care reinforce teaching and evaluation for hha

## 2018-12-27 LAB — SPECIMEN SOURCE: SIGNIFICANT CHANGE UP

## 2018-12-28 PROBLEM — I10 ESSENTIAL (PRIMARY) HYPERTENSION: Chronic | Status: ACTIVE | Noted: 2018-12-23

## 2018-12-31 PROBLEM — Z00.00 ENCOUNTER FOR PREVENTIVE HEALTH EXAMINATION: Status: ACTIVE | Noted: 2018-12-31

## 2019-01-02 ENCOUNTER — APPOINTMENT (OUTPATIENT)
Dept: SURGERY | Facility: CLINIC | Age: 74
End: 2019-01-02
Payer: COMMERCIAL

## 2019-01-02 VITALS
SYSTOLIC BLOOD PRESSURE: 127 MMHG | HEIGHT: 71 IN | HEART RATE: 71 BPM | WEIGHT: 185 LBS | BODY MASS INDEX: 25.9 KG/M2 | TEMPERATURE: 98.3 F | DIASTOLIC BLOOD PRESSURE: 80 MMHG

## 2019-01-02 PROCEDURE — 99214 OFFICE O/P EST MOD 30 MIN: CPT

## 2019-01-09 ENCOUNTER — OUTPATIENT (OUTPATIENT)
Dept: OUTPATIENT SERVICES | Facility: HOSPITAL | Age: 74
LOS: 1 days | End: 2019-01-09
Payer: MEDICARE

## 2019-01-09 DIAGNOSIS — K81.9 CHOLECYSTITIS, UNSPECIFIED: ICD-10-CM

## 2019-01-09 PROCEDURE — 47531 INJECTION FOR CHOLANGIOGRAM: CPT

## 2019-01-11 DIAGNOSIS — Z43.4 ENCOUNTER FOR ATTENTION TO OTHER ARTIFICIAL OPENINGS OF DIGESTIVE TRACT: ICD-10-CM

## 2019-01-14 ENCOUNTER — EMERGENCY (EMERGENCY)
Facility: HOSPITAL | Age: 74
LOS: 1 days | Discharge: HOME | End: 2019-01-14
Attending: EMERGENCY MEDICINE | Admitting: EMERGENCY MEDICINE
Payer: MEDICARE

## 2019-01-14 VITALS
DIASTOLIC BLOOD PRESSURE: 74 MMHG | TEMPERATURE: 99 F | SYSTOLIC BLOOD PRESSURE: 139 MMHG | HEART RATE: 75 BPM | OXYGEN SATURATION: 99 % | RESPIRATION RATE: 18 BRPM

## 2019-01-14 PROCEDURE — 93010 ELECTROCARDIOGRAM REPORT: CPT

## 2019-01-14 PROCEDURE — 99284 EMERGENCY DEPT VISIT MOD MDM: CPT | Mod: 25

## 2019-01-14 NOTE — ED ADULT TRIAGE NOTE - CHIEF COMPLAINT QUOTE
pt. c/o R hand and R lower arm swelling x 4 days, reports some itchiness, no rash/redness noted, mild swelling, no numbness, pt. w/ full ROM of the RUE, saw PMD today, sent to r/o a embolism in the R arm.  Pt. also epigastric discomfort since this evening, denies SOB. PMHx HTN, HLD. pt. c/o R hand and R lower arm swelling x 4 days, reports some itchiness, no rash/redness noted, mild swelling, no numbness, pt. w/ full ROM of the RUE, saw PMD today, sent to r/o a embolism in the R arm.  Pt. also epigastric discomfort since this evening, denies SOB. PMHx HTN, HLD.    Re-vitaled pt. at 11:27 PM. Daughter states pt. had slurred speech. Did not notice any slurred speech. Pt. appears comfortable.

## 2019-01-15 VITALS
HEART RATE: 65 BPM | RESPIRATION RATE: 16 BRPM | OXYGEN SATURATION: 95 % | DIASTOLIC BLOOD PRESSURE: 99 MMHG | SYSTOLIC BLOOD PRESSURE: 166 MMHG | TEMPERATURE: 98 F

## 2019-01-15 PROCEDURE — 93971 EXTREMITY STUDY: CPT | Mod: 26,RT

## 2019-01-15 NOTE — ED PROVIDER NOTE - MUSCULOSKELETAL, MLM
Spine appears normal, range of motion is not limited, no muscle or joint tenderness Right elbow to fingers with minimal swelling non pitting compared to left,  strength normal, pulses normal, cap refill < 2 sec, no erythema, no warmth, non tender, FROM at elbow/wrist/fingers.

## 2019-01-15 NOTE — ED PROVIDER NOTE - OBJECTIVE STATEMENT
74 yo M with HTN, HLP that currently has cholecystitis with drain by IR and no cholecystectomy that presents with right arm swelling x 3-4 days. Sent by PMD Raheem Arabellaedas @ [phone] to r/o DVT and infection for right arm. Right arm swelling from elbow to fingers, noticed by family and pt 4 days ago, sudden onset, no trauma, no rash, no fever, no chills, no N/V/D, chest pain, SOB, abd pain. Has never had this. Denies pain, no history of gout. No travel. Not on blood thinners. Denies any other symptoms but went to see PMD today and told pt to come to ED to r/o DVT and infection. Pt denies any erythema or warmth at area. No travel.

## 2019-01-15 NOTE — ED PROVIDER NOTE - PROGRESS NOTE DETAILS
No complaints, no pain. US shows no DVT per rads. Will send home, return precautions explained. f/u with PMD tomorrow.

## 2019-01-15 NOTE — ED PROVIDER NOTE - NSFOLLOWUPINSTRUCTIONS_ED_ALL_ED_FT
Please follow up with your primary care doctor after you leave the emergency department so that they can follow up and conduct more testing and treatment as they deem necessary. If you have worsening signs or symptoms of what you came in to the Emergency Department today and are not able to see your doctor, go to your nearest emergency department or return to the [facility] for further care and management.

## 2019-01-15 NOTE — ED PROVIDER NOTE - MEDICAL DECISION MAKING DETAILS
72 yo M with HTN, HLP that was sent by PMD for r/o infection vs DVT as etiology of arm swelling. Arm swelling minimal from elbow to fingers compared to left. No abnormal findings otherwise. Consider DVT vs gout vs arthritis vs unspecified edema. Denies SOB, and not tachy, EKG NSR. Unlikely PE. low concern for infection as no signs or symptoms of infection including fever, chills, pain, rash, open wounds. Normal exam otherwise. Will obtain US and most likely DC home to f/u with PMD tomorrow if normal.

## 2019-01-20 ENCOUNTER — EMERGENCY (EMERGENCY)
Facility: HOSPITAL | Age: 74
LOS: 1 days | Discharge: HOME | End: 2019-01-20
Attending: EMERGENCY MEDICINE | Admitting: EMERGENCY MEDICINE
Payer: MEDICARE

## 2019-01-20 VITALS
DIASTOLIC BLOOD PRESSURE: 91 MMHG | TEMPERATURE: 98 F | RESPIRATION RATE: 17 BRPM | HEART RATE: 64 BPM | OXYGEN SATURATION: 100 % | SYSTOLIC BLOOD PRESSURE: 151 MMHG

## 2019-01-20 LAB
ALBUMIN SERPL ELPH-MCNC: 4.1 G/DL — SIGNIFICANT CHANGE UP (ref 3.3–5)
ALP SERPL-CCNC: 63 U/L — SIGNIFICANT CHANGE UP (ref 40–120)
ALT FLD-CCNC: 13 U/L — SIGNIFICANT CHANGE UP (ref 4–41)
ANION GAP SERPL CALC-SCNC: 11 MMO/L — SIGNIFICANT CHANGE UP (ref 7–14)
AST SERPL-CCNC: 14 U/L — SIGNIFICANT CHANGE UP (ref 4–40)
BASOPHILS # BLD AUTO: 0.03 K/UL — SIGNIFICANT CHANGE UP (ref 0–0.2)
BASOPHILS NFR BLD AUTO: 0.5 % — SIGNIFICANT CHANGE UP (ref 0–2)
BILIRUB SERPL-MCNC: 0.5 MG/DL — SIGNIFICANT CHANGE UP (ref 0.2–1.2)
BUN SERPL-MCNC: 9 MG/DL — SIGNIFICANT CHANGE UP (ref 7–23)
CALCIUM SERPL-MCNC: 9.6 MG/DL — SIGNIFICANT CHANGE UP (ref 8.4–10.5)
CHLORIDE SERPL-SCNC: 103 MMOL/L — SIGNIFICANT CHANGE UP (ref 98–107)
CO2 SERPL-SCNC: 27 MMOL/L — SIGNIFICANT CHANGE UP (ref 22–31)
CREAT SERPL-MCNC: 0.79 MG/DL — SIGNIFICANT CHANGE UP (ref 0.5–1.3)
EOSINOPHIL # BLD AUTO: 0.82 K/UL — HIGH (ref 0–0.5)
EOSINOPHIL NFR BLD AUTO: 15 % — HIGH (ref 0–6)
GLUCOSE SERPL-MCNC: 94 MG/DL — SIGNIFICANT CHANGE UP (ref 70–99)
HCT VFR BLD CALC: 40.6 % — SIGNIFICANT CHANGE UP (ref 39–50)
HGB BLD-MCNC: 13.7 G/DL — SIGNIFICANT CHANGE UP (ref 13–17)
IMM GRANULOCYTES NFR BLD AUTO: 0.4 % — SIGNIFICANT CHANGE UP (ref 0–1.5)
LYMPHOCYTES # BLD AUTO: 1.33 K/UL — SIGNIFICANT CHANGE UP (ref 1–3.3)
LYMPHOCYTES # BLD AUTO: 24.4 % — SIGNIFICANT CHANGE UP (ref 13–44)
MCHC RBC-ENTMCNC: 30.4 PG — SIGNIFICANT CHANGE UP (ref 27–34)
MCHC RBC-ENTMCNC: 33.7 % — SIGNIFICANT CHANGE UP (ref 32–36)
MCV RBC AUTO: 90.2 FL — SIGNIFICANT CHANGE UP (ref 80–100)
MONOCYTES # BLD AUTO: 0.39 K/UL — SIGNIFICANT CHANGE UP (ref 0–0.9)
MONOCYTES NFR BLD AUTO: 7.1 % — SIGNIFICANT CHANGE UP (ref 2–14)
NEUTROPHILS # BLD AUTO: 2.87 K/UL — SIGNIFICANT CHANGE UP (ref 1.8–7.4)
NEUTROPHILS NFR BLD AUTO: 52.6 % — SIGNIFICANT CHANGE UP (ref 43–77)
NRBC # FLD: 0 K/UL — LOW (ref 25–125)
PLATELET # BLD AUTO: 167 K/UL — SIGNIFICANT CHANGE UP (ref 150–400)
PMV BLD: 9.5 FL — SIGNIFICANT CHANGE UP (ref 7–13)
POTASSIUM SERPL-MCNC: 4.6 MMOL/L — SIGNIFICANT CHANGE UP (ref 3.5–5.3)
POTASSIUM SERPL-SCNC: 4.6 MMOL/L — SIGNIFICANT CHANGE UP (ref 3.5–5.3)
PROT SERPL-MCNC: 6.6 G/DL — SIGNIFICANT CHANGE UP (ref 6–8.3)
RBC # BLD: 4.5 M/UL — SIGNIFICANT CHANGE UP (ref 4.2–5.8)
RBC # FLD: 13 % — SIGNIFICANT CHANGE UP (ref 10.3–14.5)
SODIUM SERPL-SCNC: 141 MMOL/L — SIGNIFICANT CHANGE UP (ref 135–145)
WBC # BLD: 5.46 K/UL — SIGNIFICANT CHANGE UP (ref 3.8–10.5)
WBC # FLD AUTO: 5.46 K/UL — SIGNIFICANT CHANGE UP (ref 3.8–10.5)

## 2019-01-20 PROCEDURE — 99053 MED SERV 10PM-8AM 24 HR FAC: CPT

## 2019-01-20 PROCEDURE — 99284 EMERGENCY DEPT VISIT MOD MDM: CPT | Mod: 25

## 2019-01-20 PROCEDURE — 76770 US EXAM ABDO BACK WALL COMP: CPT | Mod: 26

## 2019-01-20 NOTE — ED PROVIDER NOTE - MUSCULOSKELETAL, MLM
Mild TTP R palmar/dorsal aspect of hand, w/o TTP lateral aspect of forearm. Mild TTP to palpable vein right lateral forearm.  Minimal edema of hand. Mild Left flank and paraspinal pain. Spine appears normal, range of motion is not limited.

## 2019-01-20 NOTE — ED PROVIDER NOTE - NSFOLLOWUPINSTRUCTIONS_ED_ALL_ED_FT
You were found to have likely thrombophlebitis of the right arm. This was likely due to an IV placed for your gallbladder drain procedure. It is important to keep the arm raised, use heat pads, and if the pain returns try ibuprofen as needed.   You also had a kidney ultrasound due to mild back pain which showed a simple renal cyst.  Please follow up with your primary care physician to monitor symptoms. You can follow up with vascular surgery, number provided, or your cardiologist for a superficial ultrasound of the right arm to evaluate for inflammation. If symptoms continue or worsen such as uncontrolled pain, weakness, decreased sensation please call your primary care physician or return to the emergency room

## 2019-01-20 NOTE — ED ADULT TRIAGE NOTE - CHIEF COMPLAINT QUOTE
Pt. w/ hx. HLD and HTN c/o right hand swelling for 1x week. Pt. states he has been seen in the ED for same c/o , states US was - for DVT. Pt. states swelling has not decrease and states he is concern. Pt. appears in NAD , has equal strength on b/l extremities , and has + pulses.

## 2019-01-20 NOTE — ED ADULT NURSE REASSESSMENT NOTE - NS ED NURSE REASSESS COMMENT FT1
Assumed care of pt at 0845 upon return from US.  Pt awake alert in NAD, warm compress to right FA, awaiting lab and US results, daughter at bedside will cont to monitor.

## 2019-01-20 NOTE — ED PROVIDER NOTE - CARE PLAN
Principal Discharge DX:	Thrombophlebitis arm  Assessment and plan of treatment:	You were found to have likely thrombophlebitis of the right arm. This was likely due to an IV placed for your gallbladder drain procedure. It is important to keep the arm raised, use heat pads, and if the pain returns try ibuprofen as needed.

## 2019-01-20 NOTE — ED PROVIDER NOTE - NSFOLLOWUPCLINICS_GEN_ALL_ED_FT
[facility]  General Surgery  [address] [place] [address] - [address]rd [place] [address]  Phone: [phone]  Fax:   Follow Up Time: 1-3 Days

## 2019-01-20 NOTE — ED PROVIDER NOTE - PLAN OF CARE
You were found to have likely thrombophlebitis of the right arm. This was likely due to an IV placed for your gallbladder drain procedure. It is important to keep the arm raised, use heat pads, and if the pain returns try ibuprofen as needed.
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
## 2019-01-20 NOTE — ED PROVIDER NOTE - ATTENDING CONTRIBUTION TO CARE
MD Loo:  I performed a face to face bedside interview with patient regarding history of present illness, review of symptoms and past medical history. I completed an independent physical exam(documented below).  I have discussed patient's plan of care with resident.   I agree with note as stated above, having amended the EMR as needed to reflect my findings. I have discussed the assessment and plan of care.  This includes during the time I functioned as the attending physician for this patient.  PE:  Gen: Alert, NAD  Head: NC, AT,  EOMI, normal lids/conjunctiva  ENT:  normal hearing, patent oropharynx without erythema/exudate  Neck: +supple, no tenderness/meningismus/JVD, +Trachea midline  Chest: no chest wall tenderness, equal chest rise  Pulm: Bilateral BS, normal resp effort, no wheeze/stridor/retractions  CV: RRR, no M/R/G, +dist pulses  Abd: +BS, soft, NT/ND  Rectal: deferred  Mskel: no erythema/cyanosis, vein coursing along dorsal Right hand/forearm is indurated/inflamed and ttp.  back: mild L cva ttp  Skin: no rash  Neuro: AAOx3  MDM:   74yo M w/ pmh of nephrolithiasis, htn, hld, s/p IR drain for perc choley, presents c/o R dorsal forearm/hand pain. Pain is along vein which was used for IV placement when perc choley was place on 12/25. H&P most consistent w/ superficial thrombophlebitis. No fever/chills, no evidence of abscess or concern for current systemic infxn. On ROS, pt also c/o L flank pain and has history of stones. Denies urinary symptoms but will check creatinine and US kidney/bladder to r/o significant hydronephrosis. Signed pt out to Dr. Penaloza.

## 2019-01-20 NOTE — ED ADULT NURSE NOTE - OBJECTIVE STATEMENT
pt received alert and oriented x3.pmhx htn and recent cholecystitis with drain to right quadrant. drain dressing dry and intact and draining well. pt was just seen at [facility] for right hand pain and eval for dvt to right arm. pt returns today for worsening pain to right arm and swelling. + pulses to hand. + rom. pt decline any chest pain, sob, n/v/d, fevers, chills. respirations equal and unlabored. Call bell in reach, warm blanket provided, bed in lowest position, side rails up x2,safety maintained. will continue to monitor. md at bedside for eval.

## 2019-01-20 NOTE — ED PROVIDER NOTE - MEDICAL DECISION MAKING DETAILS
72 yo M w/ pmhx of HTN, HLD with IR drain due cholecystitis presenting for right sided arm pain in association to recent IV from IR procedure, negative DVT study RUE without resolving symptoms. Potentially thrombophlebitis given recent IV procedure, no signs of infectious process, gout, trauma, CVA.  -VA superficial vein.  Also left flank pain likely musculoskel, however hx of nephrolithiasis. Will get UA and renal US.

## 2019-01-20 NOTE — ED ADULT NURSE NOTE - NSIMPLEMENTINTERV_GEN_ALL_ED
Implemented All Universal Safety Interventions:  [place] to call system. Call bell, personal items and telephone within reach. Instruct patient to call for assistance. Room bathroom lighting operational. Non-slip footwear when patient is off stretcher. Physically safe environment: no spills, clutter or unnecessary equipment. Stretcher in lowest position, wheels locked, appropriate side rails in place.

## 2019-01-22 LAB — FUNGUS SPEC QL CULT: SIGNIFICANT CHANGE UP

## 2019-01-23 ENCOUNTER — OUTPATIENT (OUTPATIENT)
Dept: OUTPATIENT SERVICES | Facility: HOSPITAL | Age: 74
LOS: 1 days | End: 2019-01-23
Payer: MEDICARE

## 2019-01-23 DIAGNOSIS — K81.9 CHOLECYSTITIS, UNSPECIFIED: ICD-10-CM

## 2019-01-23 PROCEDURE — 47531 INJECTION FOR CHOLANGIOGRAM: CPT

## 2019-01-25 DIAGNOSIS — Z43.4 ENCOUNTER FOR ATTENTION TO OTHER ARTIFICIAL OPENINGS OF DIGESTIVE TRACT: ICD-10-CM

## 2019-01-30 ENCOUNTER — APPOINTMENT (OUTPATIENT)
Dept: SURGERY | Facility: CLINIC | Age: 74
End: 2019-01-30
Payer: MEDICARE

## 2019-01-30 VITALS
BODY MASS INDEX: 23.8 KG/M2 | HEART RATE: 71 BPM | HEIGHT: 71 IN | DIASTOLIC BLOOD PRESSURE: 82 MMHG | SYSTOLIC BLOOD PRESSURE: 125 MMHG | TEMPERATURE: 98.4 F | WEIGHT: 170 LBS

## 2019-01-30 PROCEDURE — 99214 OFFICE O/P EST MOD 30 MIN: CPT

## 2019-01-30 NOTE — HISTORY OF PRESENT ILLNESS
[de-identified] : Mr. Melendez is a pleasant 72 y/o M who underwent a percutaneous cholecystostomy tube placement for acute on chronic cholecystitis. He has been doing well and has no complaints. He denies fever/chills, GI symptoms or inability to tolerate PO intake. On Jan 23rd 2019 he underwent a cholangiogram which revealed a patent cystic duct. He would like his gallbladder removed ASAP.  He denies fever/chills/abdominal pain.

## 2019-01-30 NOTE — PHYSICAL EXAM
[de-identified] : Well ,comfortable [de-identified] : Soft, NT, ND, perc veda in place with a small amount of bile in bag.

## 2019-01-30 NOTE — ASSESSMENT
[FreeTextEntry1] : 74 y/o M who underwent a percutaneous cholecystostomy\par -OR planning for the end of February for cholecystectomy \par -Risks and benefits discussed at length \par -Keep tube for now\par \par I spent 25min reviewing data, images and information. Greater than 50% of my time was spent in face to face discussion regarding wound healing, postoperative diet and activity.\par \par Orion Santillan MD\par Acute Care Surgery\par \par

## 2019-02-12 ENCOUNTER — OUTPATIENT (OUTPATIENT)
Dept: OUTPATIENT SERVICES | Facility: HOSPITAL | Age: 74
LOS: 1 days | End: 2019-02-12

## 2019-02-12 VITALS
DIASTOLIC BLOOD PRESSURE: 64 MMHG | HEART RATE: 66 BPM | TEMPERATURE: 98 F | SYSTOLIC BLOOD PRESSURE: 128 MMHG | WEIGHT: 179.9 LBS | OXYGEN SATURATION: 99 % | RESPIRATION RATE: 14 BRPM | HEIGHT: 69 IN

## 2019-02-12 DIAGNOSIS — K81.1 CHRONIC CHOLECYSTITIS: ICD-10-CM

## 2019-02-12 DIAGNOSIS — E78.00 PURE HYPERCHOLESTEROLEMIA, UNSPECIFIED: ICD-10-CM

## 2019-02-12 DIAGNOSIS — I10 ESSENTIAL (PRIMARY) HYPERTENSION: ICD-10-CM

## 2019-02-12 NOTE — H&P PST ADULT - MUSCULOSKELETAL
negative detailed exam ROM intact/no joint erythema/normal strength/no joint warmth/no joint swelling/no calf tenderness

## 2019-02-12 NOTE — H&P PST ADULT - VENOUS THROMBOEMBOLISM CURRENT LABS/TEST RESULTS
none You can access the [facility] Portal, offered by [facility], by registering with the following website: [email or web address]

## 2019-02-12 NOTE — H&P PST ADULT - HISTORY OF PRESENT ILLNESS
74yo male with medical h/o HTN, HDL and Chronic cholecystitis, diagnosed 12/2018. Pt was treated at [facility] at Interventional radiology and has drain placement, right upper abdominal. Pt presents today for presurgical testing for Laparoscopic Cholecystectomy Possible Open scheduled for 2/15/2019.

## 2019-02-12 NOTE — H&P PST ADULT - NEGATIVE CARDIOVASCULAR SYMPTOMS
no palpitations/no paroxysmal nocturnal dyspnea/no claudication/no chest pain/no dyspnea on exertion/no orthopnea/no peripheral edema

## 2019-02-12 NOTE — H&P PST ADULT - NEGATIVE GASTROINTESTINAL SYMPTOMS
no constipation/no change in bowel habits/no flatulence/no abdominal pain/no diarrhea/no nausea/no vomiting

## 2019-02-12 NOTE — H&P PST ADULT - ATTENDING COMMENTS
I agree with the above note.    Cardiac clearance sent to me by cardiologist, will include in paper chart.    Orion Santillan MD (Cell: [phone])  Acute and Critical Care Surgery    The Acute Care Surgery (B Team) Attending Group Practice:  Dr. Gómez Lindsey, Dr. Nabeel Rincon, Dr. Teresa Mcgraw, Dr. Orion Santillan    Urgent issues - spectra [phone] or [phone]  Nonurgent issues - [phone]  Patient appointments or after hours - [phone]

## 2019-02-12 NOTE — H&P PST ADULT - GASTROINTESTINAL COMMENTS
chronic cholecystitis with drain in place drain in place, upper right abdomen attached to bag with yellow-bile coloured drainage

## 2019-02-12 NOTE — H&P PST ADULT - PROBLEM SELECTOR PLAN 1
Laparoscopic Cholecystectomy Possible Open scheduled for 2/15/2019. Laparoscopic Cholecystectomy Possible Open scheduled for 2/15/2019.  Pre-op instructions given. Pt verbalized understanding  Pepcid given for GI prophylaxis  Chlorhexidine wash instructions given  Cardiology clearance in chart - Labs, EKG in chart

## 2019-02-14 ENCOUNTER — TRANSCRIPTION ENCOUNTER (OUTPATIENT)
Age: 74
End: 2019-02-14

## 2019-02-14 PROBLEM — K81.1 CHRONIC CHOLECYSTITIS: Chronic | Status: ACTIVE | Noted: 2019-02-12

## 2019-02-15 ENCOUNTER — APPOINTMENT (OUTPATIENT)
Dept: SURGERY | Facility: HOSPITAL | Age: 74
End: 2019-02-15

## 2019-02-15 ENCOUNTER — INPATIENT (INPATIENT)
Facility: HOSPITAL | Age: 74
LOS: 5 days | Discharge: HOME | End: 2019-02-21
Attending: INTERNAL MEDICINE | Admitting: INTERNAL MEDICINE
Payer: MEDICARE

## 2019-02-15 ENCOUNTER — RESULT REVIEW (OUTPATIENT)
Age: 74
End: 2019-02-15

## 2019-02-15 VITALS
TEMPERATURE: 98 F | HEART RATE: 65 BPM | RESPIRATION RATE: 16 BRPM | DIASTOLIC BLOOD PRESSURE: 77 MMHG | SYSTOLIC BLOOD PRESSURE: 132 MMHG | OXYGEN SATURATION: 100 % | WEIGHT: 179.9 LBS | HEIGHT: 69 IN

## 2019-02-15 DIAGNOSIS — K81.1 CHRONIC CHOLECYSTITIS: ICD-10-CM

## 2019-02-15 PROCEDURE — 88304 TISSUE EXAM BY PATHOLOGIST: CPT | Mod: 26

## 2019-02-15 PROCEDURE — 93010 ELECTROCARDIOGRAM REPORT: CPT

## 2019-02-15 PROCEDURE — 47562 LAPAROSCOPIC CHOLECYSTECTOMY: CPT | Mod: GC

## 2019-02-15 RX ORDER — ATORVASTATIN CALCIUM 80 MG/1
80 TABLET, FILM COATED ORAL AT BEDTIME
Qty: 0 | Refills: 0 | Status: DISCONTINUED | OUTPATIENT
Start: 2019-02-15 | End: 2019-02-16

## 2019-02-15 RX ORDER — SODIUM CHLORIDE 9 MG/ML
3 INJECTION INTRAMUSCULAR; INTRAVENOUS; SUBCUTANEOUS EVERY 8 HOURS
Qty: 0 | Refills: 0 | Status: DISCONTINUED | OUTPATIENT
Start: 2019-02-15 | End: 2019-02-15

## 2019-02-15 RX ORDER — OXYCODONE HYDROCHLORIDE 5 MG/1
5 TABLET ORAL EVERY 4 HOURS
Qty: 0 | Refills: 0 | Status: DISCONTINUED | OUTPATIENT
Start: 2019-02-15 | End: 2019-02-21

## 2019-02-15 RX ORDER — HYDROMORPHONE HYDROCHLORIDE 2 MG/ML
0.5 INJECTION INTRAMUSCULAR; INTRAVENOUS; SUBCUTANEOUS
Qty: 0 | Refills: 0 | Status: DISCONTINUED | OUTPATIENT
Start: 2019-02-15 | End: 2019-02-15

## 2019-02-15 RX ORDER — ENOXAPARIN SODIUM 100 MG/ML
40 INJECTION SUBCUTANEOUS EVERY 24 HOURS
Qty: 0 | Refills: 0 | Status: DISCONTINUED | OUTPATIENT
Start: 2019-02-15 | End: 2019-02-21

## 2019-02-15 RX ORDER — ACETAMINOPHEN 500 MG
650 TABLET ORAL EVERY 6 HOURS
Qty: 0 | Refills: 0 | Status: DISCONTINUED | OUTPATIENT
Start: 2019-02-15 | End: 2019-02-16

## 2019-02-15 RX ORDER — ONDANSETRON 8 MG/1
4 TABLET, FILM COATED ORAL ONCE
Qty: 0 | Refills: 0 | Status: DISCONTINUED | OUTPATIENT
Start: 2019-02-15 | End: 2019-02-15

## 2019-02-15 RX ORDER — SODIUM CHLORIDE 9 MG/ML
1000 INJECTION, SOLUTION INTRAVENOUS
Qty: 0 | Refills: 0 | Status: DISCONTINUED | OUTPATIENT
Start: 2019-02-15 | End: 2019-02-15

## 2019-02-15 RX ORDER — OXYCODONE HYDROCHLORIDE 5 MG/1
10 TABLET ORAL EVERY 6 HOURS
Qty: 0 | Refills: 0 | Status: DISCONTINUED | OUTPATIENT
Start: 2019-02-15 | End: 2019-02-21

## 2019-02-15 RX ORDER — SODIUM CHLORIDE 9 MG/ML
1000 INJECTION, SOLUTION INTRAVENOUS
Qty: 0 | Refills: 0 | Status: DISCONTINUED | OUTPATIENT
Start: 2019-02-15 | End: 2019-02-16

## 2019-02-15 RX ORDER — HYDROMORPHONE HYDROCHLORIDE 2 MG/ML
0.25 INJECTION INTRAMUSCULAR; INTRAVENOUS; SUBCUTANEOUS
Qty: 0 | Refills: 0 | Status: DISCONTINUED | OUTPATIENT
Start: 2019-02-15 | End: 2019-02-15

## 2019-02-15 RX ADMIN — SODIUM CHLORIDE 50 MILLILITER(S): 9 INJECTION, SOLUTION INTRAVENOUS at 18:39

## 2019-02-15 RX ADMIN — HYDROMORPHONE HYDROCHLORIDE 0.5 MILLIGRAM(S): 2 INJECTION INTRAMUSCULAR; INTRAVENOUS; SUBCUTANEOUS at 19:55

## 2019-02-15 RX ADMIN — HYDROMORPHONE HYDROCHLORIDE 0.5 MILLIGRAM(S): 2 INJECTION INTRAMUSCULAR; INTRAVENOUS; SUBCUTANEOUS at 20:05

## 2019-02-15 NOTE — CHART NOTE - NSCHARTNOTEFT_GEN_A_CORE
Post-operative Check    SUBJECTIVE: No acute events in the immediate post-operative period. Pain well controlled. No nausea/vomiting    OBJECTIVE:  T(C): 37 (02-15-19 @ 20:00), Max: 37 (02-15-19 @ 20:00)  HR: 82 (02-15-19 @ 21:00) (60 - 82)  BP: 135/71 (02-15-19 @ 21:00) (132/77 - 154/76)  RR: 13 (02-15-19 @ 21:00) (8 - 17)  SpO2: 99% (02-15-19 @ 21:00) (97% - 100%)      02-15-19 @ 07:01  -  02-15-19 @ 22:28  --------------------------------------------------------  IN: 135 mL / OUT: 80 mL / NET: 55 mL        Physical Exam:   - Constitutional: AOx3, NAD  - HEENT: atraumatic  - CV: normotensive, regular rate   - Respiratory: nonlabored  - Abdomen: soft, mildly distended, appropriately tender, incisions c/d/i, RUQ drain draining dark bile stained sanguinous fluid  - Extremities: WWP  - Neurological: no focal deficits      ASSESSMENT:   SUSAN LONG is a 73y Male s/p lap subtotal fenestrated cholecystectomy, POD#0, stable.    PLAN:  - Pain management  - Follow UOP  - CLD  - Monitor drain output  - Monitor GI function    B Team Surgery  [id number]

## 2019-02-15 NOTE — BRIEF OPERATIVE NOTE - PROCEDURE
<<-----Click on this checkbox to enter Procedure Laparoscopic partial cholecystectomy  02/15/2019    Active  [id number]

## 2019-02-16 DIAGNOSIS — K81.1 CHRONIC CHOLECYSTITIS: ICD-10-CM

## 2019-02-16 LAB
ALBUMIN SERPL ELPH-MCNC: 3.6 G/DL — SIGNIFICANT CHANGE UP (ref 3.3–5)
ALP SERPL-CCNC: 46 U/L — SIGNIFICANT CHANGE UP (ref 40–120)
ALT FLD-CCNC: 39 U/L — SIGNIFICANT CHANGE UP (ref 4–41)
ANION GAP SERPL CALC-SCNC: 12 MMO/L — SIGNIFICANT CHANGE UP (ref 7–14)
AST SERPL-CCNC: 47 U/L — HIGH (ref 4–40)
BILIRUB SERPL-MCNC: 0.9 MG/DL — SIGNIFICANT CHANGE UP (ref 0.2–1.2)
BUN SERPL-MCNC: 10 MG/DL — SIGNIFICANT CHANGE UP (ref 7–23)
CALCIUM SERPL-MCNC: 8.7 MG/DL — SIGNIFICANT CHANGE UP (ref 8.4–10.5)
CHLORIDE SERPL-SCNC: 98 MMOL/L — SIGNIFICANT CHANGE UP (ref 98–107)
CO2 SERPL-SCNC: 27 MMOL/L — SIGNIFICANT CHANGE UP (ref 22–31)
CREAT SERPL-MCNC: 0.86 MG/DL — SIGNIFICANT CHANGE UP (ref 0.5–1.3)
GLUCOSE SERPL-MCNC: 117 MG/DL — HIGH (ref 70–99)
HCT VFR BLD CALC: 35.8 % — LOW (ref 39–50)
HGB BLD-MCNC: 12.3 G/DL — LOW (ref 13–17)
MAGNESIUM SERPL-MCNC: 1.7 MG/DL — SIGNIFICANT CHANGE UP (ref 1.6–2.6)
MCHC RBC-ENTMCNC: 30.1 PG — SIGNIFICANT CHANGE UP (ref 27–34)
MCHC RBC-ENTMCNC: 34.4 % — SIGNIFICANT CHANGE UP (ref 32–36)
MCV RBC AUTO: 87.5 FL — SIGNIFICANT CHANGE UP (ref 80–100)
NRBC # FLD: 0 K/UL — LOW (ref 25–125)
PHOSPHATE SERPL-MCNC: 3.6 MG/DL — SIGNIFICANT CHANGE UP (ref 2.5–4.5)
PLATELET # BLD AUTO: 181 K/UL — SIGNIFICANT CHANGE UP (ref 150–400)
PMV BLD: 9.6 FL — SIGNIFICANT CHANGE UP (ref 7–13)
POTASSIUM SERPL-MCNC: 3.9 MMOL/L — SIGNIFICANT CHANGE UP (ref 3.5–5.3)
POTASSIUM SERPL-SCNC: 3.9 MMOL/L — SIGNIFICANT CHANGE UP (ref 3.5–5.3)
PROT SERPL-MCNC: 5.6 G/DL — LOW (ref 6–8.3)
RBC # BLD: 4.09 M/UL — LOW (ref 4.2–5.8)
RBC # FLD: 14 % — SIGNIFICANT CHANGE UP (ref 10.3–14.5)
SODIUM SERPL-SCNC: 137 MMOL/L — SIGNIFICANT CHANGE UP (ref 135–145)
WBC # BLD: 7.33 K/UL — SIGNIFICANT CHANGE UP (ref 3.8–10.5)
WBC # FLD AUTO: 7.33 K/UL — SIGNIFICANT CHANGE UP (ref 3.8–10.5)

## 2019-02-16 RX ORDER — ACETAMINOPHEN 500 MG
650 TABLET ORAL EVERY 6 HOURS
Qty: 0 | Refills: 0 | Status: DISCONTINUED | OUTPATIENT
Start: 2019-02-16 | End: 2019-02-21

## 2019-02-16 RX ORDER — MAGNESIUM SULFATE 500 MG/ML
2 VIAL (ML) INJECTION ONCE
Qty: 0 | Refills: 0 | Status: COMPLETED | OUTPATIENT
Start: 2019-02-16 | End: 2019-02-16

## 2019-02-16 RX ORDER — ROSUVASTATIN CALCIUM 5 MG/1
40 TABLET ORAL AT BEDTIME
Qty: 0 | Refills: 0 | Status: DISCONTINUED | OUTPATIENT
Start: 2019-02-16 | End: 2019-02-21

## 2019-02-16 RX ORDER — SODIUM CHLORIDE 9 MG/ML
1000 INJECTION, SOLUTION INTRAVENOUS
Qty: 0 | Refills: 0 | Status: DISCONTINUED | OUTPATIENT
Start: 2019-02-16 | End: 2019-02-16

## 2019-02-16 RX ADMIN — Medication 50 GRAM(S): at 11:53

## 2019-02-16 RX ADMIN — OXYCODONE HYDROCHLORIDE 10 MILLIGRAM(S): 5 TABLET ORAL at 19:52

## 2019-02-16 RX ADMIN — ENOXAPARIN SODIUM 40 MILLIGRAM(S): 100 INJECTION SUBCUTANEOUS at 05:16

## 2019-02-16 RX ADMIN — OXYCODONE HYDROCHLORIDE 10 MILLIGRAM(S): 5 TABLET ORAL at 01:22

## 2019-02-16 RX ADMIN — OXYCODONE HYDROCHLORIDE 10 MILLIGRAM(S): 5 TABLET ORAL at 07:34

## 2019-02-16 RX ADMIN — Medication 650 MILLIGRAM(S): at 11:44

## 2019-02-16 RX ADMIN — Medication 650 MILLIGRAM(S): at 18:06

## 2019-02-16 RX ADMIN — Medication 650 MILLIGRAM(S): at 19:06

## 2019-02-16 RX ADMIN — Medication 650 MILLIGRAM(S): at 23:47

## 2019-02-16 RX ADMIN — OXYCODONE HYDROCHLORIDE 10 MILLIGRAM(S): 5 TABLET ORAL at 07:04

## 2019-02-16 RX ADMIN — ROSUVASTATIN CALCIUM 40 MILLIGRAM(S): 5 TABLET ORAL at 22:02

## 2019-02-16 RX ADMIN — OXYCODONE HYDROCHLORIDE 10 MILLIGRAM(S): 5 TABLET ORAL at 19:10

## 2019-02-16 RX ADMIN — OXYCODONE HYDROCHLORIDE 10 MILLIGRAM(S): 5 TABLET ORAL at 14:13

## 2019-02-16 RX ADMIN — Medication 650 MILLIGRAM(S): at 12:44

## 2019-02-16 RX ADMIN — OXYCODONE HYDROCHLORIDE 10 MILLIGRAM(S): 5 TABLET ORAL at 13:13

## 2019-02-16 RX ADMIN — OXYCODONE HYDROCHLORIDE 10 MILLIGRAM(S): 5 TABLET ORAL at 00:52

## 2019-02-16 NOTE — PROGRESS NOTE ADULT - SUBJECTIVE AND OBJECTIVE BOX
B Team Surgery Progress Note    Interval: No acute overnight events.    SUBJECTIVE: Patient seen and examined at the bedside. Feeling well this morning. Tolerating clear liquids without nausea or vomiting. Pain is well controlled. Has passed flatus and a bowel movement. Minimal void and bladder scan showing 550 of urine. Will have straight cath.     VITALS  T(C): 37.4 (02-16-19 @ 05:18), Max: 37.5 (02-16-19 @ 02:15)  HR: 80 (02-16-19 @ 05:18) (60 - 87)  BP: 141/88 (02-16-19 @ 05:18) (132/77 - 154/76)  RR: 17 (02-16-19 @ 05:18) (8 - 17)  SpO2: 97% (02-16-19 @ 05:18) (96% - 100%)      Is/Os    02-15 @ 07:01  -  02-16 @ 07:00  --------------------------------------------------------  IN:    lactated ringers.: 575 mL    Oral Fluid: 210 mL  Total IN: 785 mL    OUT:    Bulb: 310 mL    Voided: 105 mL  Total OUT: 415 mL    Total NET: 370 mL    PHYSICAL EXAM:   General: NAD, Lying in bed comfortably, alert, oriented x3  Pulm: Non-labored breathing on RA  GI/Abd: Soft, NT, mildly distended, no rebound/guarding, YOUSUF drain with bilious output    MEDICATIONS (STANDING): atorvastatin 80 milliGRAM(s) Oral at bedtime  enoxaparin Injectable 40 milliGRAM(s) SubCutaneous every 24 hours  lactated ringers. 1000 milliLiter(s) IV Continuous <Continuous>    MEDICATIONS (PRN):acetaminophen   Tablet .. 650 milliGRAM(s) Oral every 6 hours PRN Mild Pain (1 - 3)  oxyCODONE    IR 5 milliGRAM(s) Oral every 4 hours PRN Moderate Pain (4 - 6)  oxyCODONE    IR 10 milliGRAM(s) Oral every 6 hours PRN Severe Pain (7 - 10)    LABS  CBC (02-16 @ 07:35)                              12.3<L>                         7.33    )----------------(  181        --    % Neutrophils, --    % Lymphocytes, ANC: --                                  35.8<L>    BMP (02-16 @ 07:35)             137     |  98      |  10    		Ca++ --      Ca 8.7                ---------------------------------( 117<H>		Mg 1.7                3.9     |  27      |  0.86  			Ph 3.6       LFTs (02-16 @ 07:35)      TPro 5.6<L> / Alb 3.6 / TBili 0.9 / DBili -- / AST 47<H> / ALT 39 / AlkPhos 46            IMAGING STUDIES

## 2019-02-16 NOTE — CONSULT NOTE ADULT - SUBJECTIVE AND OBJECTIVE BOX
Chief Complaint:  Patient is a 73y old  Male who presents with a chief complaint of abd pain psot lap veda had a difficult gb reswectin and found to have a leak    Allergies:  Cipro (Hives)  morphine (Hives)  peanuts (Hives)      Medications:  acetaminophen   Tablet .. 650 milliGRAM(s) Oral every 6 hours  enoxaparin Injectable 40 milliGRAM(s) SubCutaneous every 24 hours  oxyCODONE    IR 5 milliGRAM(s) Oral every 4 hours PRN  oxyCODONE    IR 10 milliGRAM(s) Oral every 6 hours PRN  rosuvastatin 40 milliGRAM(s) Oral at bedtime      PMHX/PSHX:  Chronic cholecystitis  Essential hypertension  High cholesterol  No significant past surgical history  No significant past surgical history      Family history:  No pertinent family history in first degree relatives      Social History: lives at home no etoh no cigs no ivda    ROS:     General:  No wt loss, fevers, chills, night sweats, fatigue,   Eyes:  Good vision, no reported pain  ENT:  No sore throat, pain, runny nose, dysphagia  CV:  No pain, palpitations, hypo/hypertension  Resp:  No dyspnea, cough, tachypnea, wheezing  GI:  No pain, No nausea, No vomiting, No diarrhea, No constipation, No weight loss, No fever, No pruritis, No rectal bleeding, No tarry stools, No dysphagia,  :  No pain, bleeding, incontinence, nocturia  Muscle:  No pain, weakness  Neuro:  No weakness, tingling, memory problems  Psych:  No fatigue, insomnia, mood problems, depression  Endocrine:  No polyuria, polydipsia, cold/heat intolerance  Heme:  No petechiae, ecchymosis, easy bruisability  Skin:  No rash, tattoos, scars, edema      PHYSICAL EXAM:   Vital Signs:  Vital Signs Last 24 Hrs  T(C): 37.3 (16 Feb 2019 13:40), Max: 37.5 (16 Feb 2019 02:15)  T(F): 99.2 (16 Feb 2019 13:40), Max: 99.5 (16 Feb 2019 02:15)  HR: 69 (16 Feb 2019 13:40) (57 - 87)  BP: 123/59 (16 Feb 2019 13:40) (123/59 - 154/76)  BP(mean): 85 (15 Feb 2019 21:00) (83 - 96)  RR: 17 (16 Feb 2019 13:40) (8 - 17)  SpO2: 97% (16 Feb 2019 13:40) (96% - 100%)  Daily     Daily     GENERAL:  Appears stated age, well-groomed, well-nourished, no distress  HEENT:  NC/AT,  conjunctivae clear and pink, no thyromegaly, nodules, adenopathy, no JVD, sclera -anicteric  CHEST:  Full & symmetric excursion, no increased effort, breath sounds clear  HEART:  Regular rhythm, S1, S2, no murmur/rub/S3/S4, no abdominal bruit, no edema  ABDOMEN:  Soft, non-tender, non-distended, normoactive bowel sounds,  no masses ,no hepato-splenomegaly, no signs of chronic liver disease  EXTEREMITIES:  no cyanosis,clubbing or edema  SKIN:  No rash/erythema/ecchymoses/petechiae/wounds/abscess/warm/dry  NEURO:  Alert, oriented, no asterixis, no tremor, no encephalopathy    LABS:                        12.3   7.33  )-----------( 181      ( 16 Feb 2019 07:35 )             35.8     02-16    137  |  98  |  10  ----------------------------<  117<H>  3.9   |  27  |  0.86    Ca    8.7      16 Feb 2019 07:35  Phos  3.6     02-16  Mg     1.7     02-16    TPro  5.6<L>  /  Alb  3.6  /  TBili  0.9  /  DBili  x   /  AST  47<H>  /  ALT  39  /  AlkPhos  46  02-16    LIVER FUNCTIONS - ( 16 Feb 2019 07:35 )  Alb: 3.6 g/dL / Pro: 5.6 g/dL / ALK PHOS: 46 u/L / ALT: 39 u/L / AST: 47 u/L / GGT: x                   Imaging:

## 2019-02-17 LAB
ALBUMIN SERPL ELPH-MCNC: 3.7 G/DL — SIGNIFICANT CHANGE UP (ref 3.3–5)
ALP SERPL-CCNC: 49 U/L — SIGNIFICANT CHANGE UP (ref 40–120)
ALT FLD-CCNC: 34 U/L — SIGNIFICANT CHANGE UP (ref 4–41)
ANION GAP SERPL CALC-SCNC: 11 MMO/L — SIGNIFICANT CHANGE UP (ref 7–14)
AST SERPL-CCNC: 36 U/L — SIGNIFICANT CHANGE UP (ref 4–40)
BILIRUB DIRECT SERPL-MCNC: 0.2 MG/DL — SIGNIFICANT CHANGE UP (ref 0.1–0.2)
BILIRUB SERPL-MCNC: 1.1 MG/DL — SIGNIFICANT CHANGE UP (ref 0.2–1.2)
BUN SERPL-MCNC: 8 MG/DL — SIGNIFICANT CHANGE UP (ref 7–23)
CALCIUM SERPL-MCNC: 9.1 MG/DL — SIGNIFICANT CHANGE UP (ref 8.4–10.5)
CHLORIDE SERPL-SCNC: 97 MMOL/L — LOW (ref 98–107)
CO2 SERPL-SCNC: 29 MMOL/L — SIGNIFICANT CHANGE UP (ref 22–31)
CREAT SERPL-MCNC: 0.85 MG/DL — SIGNIFICANT CHANGE UP (ref 0.5–1.3)
GLUCOSE SERPL-MCNC: 128 MG/DL — HIGH (ref 70–99)
HCT VFR BLD CALC: 39.9 % — SIGNIFICANT CHANGE UP (ref 39–50)
HGB BLD-MCNC: 13.2 G/DL — SIGNIFICANT CHANGE UP (ref 13–17)
MAGNESIUM SERPL-MCNC: 1.8 MG/DL — SIGNIFICANT CHANGE UP (ref 1.6–2.6)
MCHC RBC-ENTMCNC: 30 PG — SIGNIFICANT CHANGE UP (ref 27–34)
MCHC RBC-ENTMCNC: 33.1 % — SIGNIFICANT CHANGE UP (ref 32–36)
MCV RBC AUTO: 90.7 FL — SIGNIFICANT CHANGE UP (ref 80–100)
NRBC # FLD: 0 K/UL — LOW (ref 25–125)
PHOSPHATE SERPL-MCNC: 2.5 MG/DL — SIGNIFICANT CHANGE UP (ref 2.5–4.5)
PLATELET # BLD AUTO: 195 K/UL — SIGNIFICANT CHANGE UP (ref 150–400)
PMV BLD: 9.4 FL — SIGNIFICANT CHANGE UP (ref 7–13)
POTASSIUM SERPL-MCNC: 4.1 MMOL/L — SIGNIFICANT CHANGE UP (ref 3.5–5.3)
POTASSIUM SERPL-SCNC: 4.1 MMOL/L — SIGNIFICANT CHANGE UP (ref 3.5–5.3)
PROT SERPL-MCNC: 6.2 G/DL — SIGNIFICANT CHANGE UP (ref 6–8.3)
RBC # BLD: 4.4 M/UL — SIGNIFICANT CHANGE UP (ref 4.2–5.8)
RBC # FLD: 14.1 % — SIGNIFICANT CHANGE UP (ref 10.3–14.5)
SODIUM SERPL-SCNC: 137 MMOL/L — SIGNIFICANT CHANGE UP (ref 135–145)
WBC # BLD: 7.37 K/UL — SIGNIFICANT CHANGE UP (ref 3.8–10.5)
WBC # FLD AUTO: 7.37 K/UL — SIGNIFICANT CHANGE UP (ref 3.8–10.5)

## 2019-02-17 RX ORDER — PANTOPRAZOLE SODIUM 20 MG/1
40 TABLET, DELAYED RELEASE ORAL
Qty: 0 | Refills: 0 | Status: DISCONTINUED | OUTPATIENT
Start: 2019-02-17 | End: 2019-02-21

## 2019-02-17 RX ADMIN — OXYCODONE HYDROCHLORIDE 10 MILLIGRAM(S): 5 TABLET ORAL at 07:24

## 2019-02-17 RX ADMIN — Medication 650 MILLIGRAM(S): at 12:04

## 2019-02-17 RX ADMIN — Medication 650 MILLIGRAM(S): at 18:31

## 2019-02-17 RX ADMIN — OXYCODONE HYDROCHLORIDE 10 MILLIGRAM(S): 5 TABLET ORAL at 15:44

## 2019-02-17 RX ADMIN — Medication 63.75 MILLIMOLE(S): at 12:49

## 2019-02-17 RX ADMIN — OXYCODONE HYDROCHLORIDE 10 MILLIGRAM(S): 5 TABLET ORAL at 23:13

## 2019-02-17 RX ADMIN — OXYCODONE HYDROCHLORIDE 5 MILLIGRAM(S): 5 TABLET ORAL at 12:06

## 2019-02-17 RX ADMIN — Medication 650 MILLIGRAM(S): at 00:17

## 2019-02-17 RX ADMIN — OXYCODONE HYDROCHLORIDE 10 MILLIGRAM(S): 5 TABLET ORAL at 16:00

## 2019-02-17 RX ADMIN — Medication 650 MILLIGRAM(S): at 23:17

## 2019-02-17 RX ADMIN — Medication 650 MILLIGRAM(S): at 19:00

## 2019-02-17 RX ADMIN — ENOXAPARIN SODIUM 40 MILLIGRAM(S): 100 INJECTION SUBCUTANEOUS at 05:57

## 2019-02-17 RX ADMIN — OXYCODONE HYDROCHLORIDE 10 MILLIGRAM(S): 5 TABLET ORAL at 01:44

## 2019-02-17 RX ADMIN — Medication 650 MILLIGRAM(S): at 06:27

## 2019-02-17 RX ADMIN — OXYCODONE HYDROCHLORIDE 10 MILLIGRAM(S): 5 TABLET ORAL at 01:14

## 2019-02-17 RX ADMIN — ROSUVASTATIN CALCIUM 40 MILLIGRAM(S): 5 TABLET ORAL at 23:17

## 2019-02-17 RX ADMIN — OXYCODONE HYDROCHLORIDE 5 MILLIGRAM(S): 5 TABLET ORAL at 10:52

## 2019-02-17 RX ADMIN — Medication 650 MILLIGRAM(S): at 14:33

## 2019-02-17 RX ADMIN — OXYCODONE HYDROCHLORIDE 10 MILLIGRAM(S): 5 TABLET ORAL at 07:54

## 2019-02-17 RX ADMIN — Medication 650 MILLIGRAM(S): at 05:57

## 2019-02-17 NOTE — PROVIDER CONTACT NOTE (OTHER) - ASSESSMENT
Pt with no complains of feeling pressure to void or urgency
9/10 pain, no SOB, difficulty breathing, VS stable.
Pt with no complains at this time

## 2019-02-17 NOTE — PROGRESS NOTE ADULT - SUBJECTIVE AND OBJECTIVE BOX
B Team Surgery Progress Note    Interval: Advanced to regular diet and tolerating well. Passed TOV. Patient was having some leaking of bilious fluid around the gina drain tubing yesterday, dressing was reinforced. No acute overnight events.    SUBJECTIVE: Patient seen and examined at the bedside. Feeling well this morning. Tolerating regular diet without nausea or vomiting. Pain is well controlled. Has passed flatus, has not passed a bowel movement. Ambulating well.     VITALS  T(C): 37.7 (02-16-19 @ 21:30), Max: 37.7 (02-16-19 @ 21:30)  HR: 75 (02-16-19 @ 21:30) (57 - 87)  BP: 143/82 (02-16-19 @ 21:30) (123/59 - 143/82)  RR: 16 (02-16-19 @ 21:30) (16 - 17)  SpO2: 98% (02-16-19 @ 21:30) (96% - 98%)    Is/Os    02-15 @ 07:01  -  02-16 @ 07:00  --------------------------------------------------------  IN:    lactated ringers.: 575 mL    Oral Fluid: 210 mL  Total IN: 785 mL    OUT:    Bulb: 310 mL    Voided: 105 mL  Total OUT: 415 mL    Total NET: 370 mL      02-16 @ 07:01  -  02-17 @ 01:54  --------------------------------------------------------  IN:  Total IN: 0 mL    OUT:    Bulb: 65 mL    Voided: 975 mL  Total OUT: 1040 mL    Total NET: -1040 mL    PHYSICAL EXAM:   General: NAD, Lying in bed comfortably, alert, oriented x3  Pulm: Non-labored breathing on RA  GI/Abd: Softly distended, non-tender, no rebound/guarding, YOUSUF drain in place and collecting bilious fluid     MEDICATIONS (STANDING): acetaminophen   Tablet .. 650 milliGRAM(s) Oral every 6 hours  enoxaparin Injectable 40 milliGRAM(s) SubCutaneous every 24 hours  rosuvastatin 40 milliGRAM(s) Oral at bedtime    MEDICATIONS (PRN):oxyCODONE    IR 5 milliGRAM(s) Oral every 4 hours PRN Moderate Pain (4 - 6)  oxyCODONE    IR 10 milliGRAM(s) Oral every 6 hours PRN Severe Pain (7 - 10)    LABS  CBC (02-16 @ 07:35)                              12.3<L>                         7.33    )----------------(  181        --    % Neutrophils, --    % Lymphocytes, ANC: --                                  35.8<L>    BMP (02-16 @ 07:35)             137     |  98      |  10    		Ca++ --      Ca 8.7                ---------------------------------( 117<H>		Mg 1.7                3.9     |  27      |  0.86  			Ph 3.6       LFTs (02-16 @ 07:35)      TPro 5.6<L> / Alb 3.6 / TBili 0.9 / DBili -- / AST 47<H> / ALT 39 / AlkPhos 46 B Team Surgery Progress Note    Interval: Advanced to regular diet and tolerating well. Passed TOV. Patient was having some leaking of bilious fluid around the gina drain tubing yesterday, dressing was reinforced. Dressing has not needed to be changed or reinforced since. No acute overnight events.    SUBJECTIVE: Patient seen and examined at the bedside. Feeling well this morning. Tolerating regular diet without nausea or vomiting. Pain is well controlled. Has passed flatus, has not passed a bowel movement. Ambulating well.     VITALS  T(C): 37.7 (02-16-19 @ 21:30), Max: 37.7 (02-16-19 @ 21:30)  HR: 75 (02-16-19 @ 21:30) (57 - 87)  BP: 143/82 (02-16-19 @ 21:30) (123/59 - 143/82)  RR: 16 (02-16-19 @ 21:30) (16 - 17)  SpO2: 98% (02-16-19 @ 21:30) (96% - 98%)    Is/Os    02-15 @ 07:01  -  02-16 @ 07:00  --------------------------------------------------------  IN:    lactated ringers.: 575 mL    Oral Fluid: 210 mL  Total IN: 785 mL    OUT:    Bulb: 310 mL    Voided: 105 mL  Total OUT: 415 mL    Total NET: 370 mL      02-16 @ 07:01  -  02-17 @ 01:54  --------------------------------------------------------  IN:  Total IN: 0 mL    OUT:    Bulb: 65 mL    Voided: 975 mL  Total OUT: 1040 mL    Total NET: -1040 mL    PHYSICAL EXAM:   General: NAD, Lying in bed comfortably, alert, oriented x3  Pulm: Non-labored breathing on RA  GI/Abd: Softly distended, non-tender, no rebound/guarding, YOUSUF drain in place and collecting bilious fluid     MEDICATIONS (STANDING): acetaminophen   Tablet .. 650 milliGRAM(s) Oral every 6 hours  enoxaparin Injectable 40 milliGRAM(s) SubCutaneous every 24 hours  rosuvastatin 40 milliGRAM(s) Oral at bedtime    MEDICATIONS (PRN):oxyCODONE    IR 5 milliGRAM(s) Oral every 4 hours PRN Moderate Pain (4 - 6)  oxyCODONE    IR 10 milliGRAM(s) Oral every 6 hours PRN Severe Pain (7 - 10)    LABS  CBC (02-16 @ 07:35)                              12.3<L>                         7.33    )----------------(  181        --    % Neutrophils, --    % Lymphocytes, ANC: --                                  35.8<L>    BMP (02-16 @ 07:35)             137     |  98      |  10    		Ca++ --      Ca 8.7                ---------------------------------( 117<H>		Mg 1.7                3.9     |  27      |  0.86  			Ph 3.6       LFTs (02-16 @ 07:35)      TPro 5.6<L> / Alb 3.6 / TBili 0.9 / DBili -- / AST 47<H> / ALT 39 / AlkPhos 46

## 2019-02-17 NOTE — PROVIDER CONTACT NOTE (OTHER) - SITUATION
Patient due to void at 2:30 am, however urine o/p has been 75ml since on floor
Patients YOUSUF O/P is 100, pt requesting crestor instead of lipitor for PM
Pt complaints of 9/10 pain, previously received prn oxy 10 and tylenol. Next prn oxy dose not due

## 2019-02-17 NOTE — PROVIDER CONTACT NOTE (OTHER) - REASON
Pain
Patients YOUSUF O/P is 100, pt requesting crestor instead of lipitor for PM
Patient due to void at 2:30 am, however urine o/p has been 75ml since on floor

## 2019-02-17 NOTE — PROGRESS NOTE ADULT - ASSESSMENT
ASSESSMENT  73M s/p lap subtotal fenestrated cholecystectomy, POD#1, stable and tolerating a regular diet    PLAN:  - Cont regular diet  - Pain management prn  - F/u voids  - Monitor drain output and possible drainage around the tubing  - Monitor GI function  - F/u GI plan for ERCP    B Team Surgery  [id number].

## 2019-02-17 NOTE — PROVIDER CONTACT NOTE (OTHER) - BACKGROUND
Pt s/p subtotal lap veda
Pt s/p subtotal lap veda
s/p lap subtotal fenestrating cholecystectomy w/ YOUSUF drain

## 2019-02-17 NOTE — PROGRESS NOTE ADULT - PROBLEM SELECTOR PLAN 1
bile leak will need ercp r/b/a were and explained and he is aware will need the procedure   check in and out  check drain outpt  to follow   ppi once a day

## 2019-02-17 NOTE — PROVIDER CONTACT NOTE (OTHER) - RECOMMENDATIONS
MD notified
None at this time, pt can refuse lipitor
Provider to be made aware, breakthrough meds, see pt?

## 2019-02-17 NOTE — PROVIDER CONTACT NOTE (OTHER) - ACTION/TREATMENT ORDERED:
None at this time, inform MD at 6pm regarding urine O/P
Provider aware, provider to see pt., will continue to monitor pt.

## 2019-02-17 NOTE — PROGRESS NOTE ADULT - SUBJECTIVE AND OBJECTIVE BOX
INTERVAL HPI/OVERNIGHT EVENTS:  No new overnight event.  No N/V/D.  Tolerating diet.  ruq pain is better drain with bile in place    Allergies    Cipro (Hives)  morphine (Hives)  peanuts (Hives)    Intolerances  General:  No wt loss, fevers, chills, night sweats, fatigue,   Eyes:  Good vision, no reported pain  ENT:  No sore throat, pain, runny nose, dysphagia  CV:  No pain, palpitations, hypo/hypertension  Resp:  No dyspnea, cough, tachypnea, wheezing  GI:  No pain, No nausea, No vomiting, No diarrhea, No constipation, No weight loss, No fever, No pruritis, No rectal bleeding, No tarry stools, No dysphagia,  :  No pain, bleeding, incontinence, nocturia  Muscle:  No pain, weakness  Neuro:  No weakness, tingling, memory problems  Psych:  No fatigue, insomnia, mood problems, depression  Endocrine:  No polyuria, polydipsia, cold/heat intolerance  Heme:  No petechiae, ecchymosis, easy bruisability  Skin:  No rash, tattoos, scars, edema      PHYSICAL EXAM:   Vital Signs:  Vital Signs Last 24 Hrs  T(C): 37.7 (17 Feb 2019 14:00), Max: 37.7 (16 Feb 2019 21:30)  T(F): 99.8 (17 Feb 2019 14:00), Max: 99.9 (16 Feb 2019 21:30)  HR: 73 (17 Feb 2019 14:00) (60 - 76)  BP: 117/64 (17 Feb 2019 14:00) (108/70 - 143/82)  BP(mean): --  RR: 17 (17 Feb 2019 14:00) (16 - 17)  SpO2: 98% (17 Feb 2019 14:00) (96% - 98%)  Daily     Daily I&O's Summary    16 Feb 2019 07:01  -  17 Feb 2019 07:00  --------------------------------------------------------  IN: 0 mL / OUT: 2000 mL / NET: -2000 mL    17 Feb 2019 07:01  -  17 Feb 2019 15:27  --------------------------------------------------------  IN: 0 mL / OUT: 15 mL / NET: -15 mL        GENERAL:  Appears stated age, well-groomed, well-nourished, no distress  HEENT:  NC/AT,  conjunctivae clear and pink, no thyromegaly, nodules, adenopathy, no JVD, sclera -anicteric  CHEST:  Full & symmetric excursion, no increased effort, breath sounds clear  HEART:  Regular rhythm, S1, S2, no murmur/rub/S3/S4, no abdominal bruit, no edema  ABDOMEN:  Soft, non-tender, non-distended, normoactive bowel sounds,  no masses ,no hepato-splenomegaly, no signs of chronic liver disease  EXTEREMITIES:  no cyanosis,clubbing or edema  SKIN:  No rash/erythema/ecchymoses/petechiae/wounds/abscess/warm/dry  NEURO:  Alert, oriented, no asterixis, no tremor, no encephalopathy      LABS:                        13.2   7.37  )-----------( 195      ( 17 Feb 2019 07:10 )             39.9     02-17    137  |  97<L>  |  8   ----------------------------<  128<H>  4.1   |  29  |  0.85    Ca    9.1      17 Feb 2019 07:10  Phos  2.5     02-17  Mg     1.8     02-17    TPro  6.2  /  Alb  3.7  /  TBili  1.1  /  DBili  0.2  /  AST  36  /  ALT  34  /  AlkPhos  49  02-17        amylase   lipase  RADIOLOGY & ADDITIONAL TESTS:

## 2019-02-18 DIAGNOSIS — K91.89 OTHER POSTPROCEDURAL COMPLICATIONS AND DISORDERS OF DIGESTIVE SYSTEM: ICD-10-CM

## 2019-02-18 LAB
ANION GAP SERPL CALC-SCNC: 11 MMO/L — SIGNIFICANT CHANGE UP (ref 7–14)
BUN SERPL-MCNC: 8 MG/DL — SIGNIFICANT CHANGE UP (ref 7–23)
CALCIUM SERPL-MCNC: 9.6 MG/DL — SIGNIFICANT CHANGE UP (ref 8.4–10.5)
CHLORIDE SERPL-SCNC: 98 MMOL/L — SIGNIFICANT CHANGE UP (ref 98–107)
CO2 SERPL-SCNC: 31 MMOL/L — SIGNIFICANT CHANGE UP (ref 22–31)
CREAT SERPL-MCNC: 0.94 MG/DL — SIGNIFICANT CHANGE UP (ref 0.5–1.3)
GLUCOSE SERPL-MCNC: 95 MG/DL — SIGNIFICANT CHANGE UP (ref 70–99)
HCT VFR BLD CALC: 39 % — SIGNIFICANT CHANGE UP (ref 39–50)
HGB BLD-MCNC: 13.1 G/DL — SIGNIFICANT CHANGE UP (ref 13–17)
MAGNESIUM SERPL-MCNC: 1.8 MG/DL — SIGNIFICANT CHANGE UP (ref 1.6–2.6)
MCHC RBC-ENTMCNC: 30 PG — SIGNIFICANT CHANGE UP (ref 27–34)
MCHC RBC-ENTMCNC: 33.6 % — SIGNIFICANT CHANGE UP (ref 32–36)
MCV RBC AUTO: 89.4 FL — SIGNIFICANT CHANGE UP (ref 80–100)
NRBC # FLD: 0 K/UL — LOW (ref 25–125)
PHOSPHATE SERPL-MCNC: 3.4 MG/DL — SIGNIFICANT CHANGE UP (ref 2.5–4.5)
PLATELET # BLD AUTO: 193 K/UL — SIGNIFICANT CHANGE UP (ref 150–400)
PMV BLD: 9 FL — SIGNIFICANT CHANGE UP (ref 7–13)
POTASSIUM SERPL-MCNC: 4.1 MMOL/L — SIGNIFICANT CHANGE UP (ref 3.5–5.3)
POTASSIUM SERPL-SCNC: 4.1 MMOL/L — SIGNIFICANT CHANGE UP (ref 3.5–5.3)
RBC # BLD: 4.36 M/UL — SIGNIFICANT CHANGE UP (ref 4.2–5.8)
RBC # FLD: 13.6 % — SIGNIFICANT CHANGE UP (ref 10.3–14.5)
SODIUM SERPL-SCNC: 140 MMOL/L — SIGNIFICANT CHANGE UP (ref 135–145)
WBC # BLD: 7.08 K/UL — SIGNIFICANT CHANGE UP (ref 3.8–10.5)
WBC # FLD AUTO: 7.08 K/UL — SIGNIFICANT CHANGE UP (ref 3.8–10.5)

## 2019-02-18 RX ORDER — SODIUM CHLORIDE 9 MG/ML
1000 INJECTION, SOLUTION INTRAVENOUS
Qty: 0 | Refills: 0 | Status: DISCONTINUED | OUTPATIENT
Start: 2019-02-18 | End: 2019-02-20

## 2019-02-18 RX ORDER — MAGNESIUM SULFATE 500 MG/ML
2 VIAL (ML) INJECTION ONCE
Qty: 0 | Refills: 0 | Status: COMPLETED | OUTPATIENT
Start: 2019-02-18 | End: 2019-02-18

## 2019-02-18 RX ADMIN — OXYCODONE HYDROCHLORIDE 10 MILLIGRAM(S): 5 TABLET ORAL at 08:03

## 2019-02-18 RX ADMIN — ROSUVASTATIN CALCIUM 40 MILLIGRAM(S): 5 TABLET ORAL at 22:25

## 2019-02-18 RX ADMIN — Medication 650 MILLIGRAM(S): at 00:13

## 2019-02-18 RX ADMIN — Medication 650 MILLIGRAM(S): at 08:03

## 2019-02-18 RX ADMIN — Medication 650 MILLIGRAM(S): at 13:09

## 2019-02-18 RX ADMIN — Medication 50 GRAM(S): at 10:08

## 2019-02-18 RX ADMIN — OXYCODONE HYDROCHLORIDE 10 MILLIGRAM(S): 5 TABLET ORAL at 14:09

## 2019-02-18 RX ADMIN — OXYCODONE HYDROCHLORIDE 10 MILLIGRAM(S): 5 TABLET ORAL at 00:13

## 2019-02-18 RX ADMIN — Medication 650 MILLIGRAM(S): at 23:51

## 2019-02-18 RX ADMIN — Medication 650 MILLIGRAM(S): at 07:03

## 2019-02-18 RX ADMIN — ENOXAPARIN SODIUM 40 MILLIGRAM(S): 100 INJECTION SUBCUTANEOUS at 07:03

## 2019-02-18 RX ADMIN — Medication 650 MILLIGRAM(S): at 14:09

## 2019-02-18 RX ADMIN — OXYCODONE HYDROCHLORIDE 10 MILLIGRAM(S): 5 TABLET ORAL at 22:25

## 2019-02-18 RX ADMIN — OXYCODONE HYDROCHLORIDE 10 MILLIGRAM(S): 5 TABLET ORAL at 07:03

## 2019-02-18 RX ADMIN — Medication 650 MILLIGRAM(S): at 19:19

## 2019-02-18 RX ADMIN — OXYCODONE HYDROCHLORIDE 10 MILLIGRAM(S): 5 TABLET ORAL at 23:00

## 2019-02-18 RX ADMIN — PANTOPRAZOLE SODIUM 40 MILLIGRAM(S): 20 TABLET, DELAYED RELEASE ORAL at 07:08

## 2019-02-18 RX ADMIN — OXYCODONE HYDROCHLORIDE 10 MILLIGRAM(S): 5 TABLET ORAL at 13:07

## 2019-02-18 NOTE — PROGRESS NOTE ADULT - SUBJECTIVE AND OBJECTIVE BOX
B Team Surgery Progress Note    Interval: Appropriate drain output thus far, minimal leakage from around the tube. No acute overnight events.    SUBJECTIVE: Patient seen and examined at the bedside. Feeling well this morning. Tolerating regular diet without nausea or vomiting. Pain is well controlled. Has passed flatus and a bowel movement. Ambulating well.     VITALS  T(C): 37.7 (02-18-19 @ 07:01), Max: 37.7 (02-17-19 @ 14:00)  HR: 69 (02-18-19 @ 07:01) (65 - 73)  BP: 141/67 (02-18-19 @ 07:01) (117/64 - 141/86)  RR: 18 (02-18-19 @ 07:01) (17 - 18)  SpO2: 98% (02-18-19 @ 07:01) (96% - 100%)    Is/Os    02-17 @ 07:01  -  02-18 @ 07:00  --------------------------------------------------------  IN:    Oral Fluid: 300 mL  Total IN: 300 mL    OUT:    Bulb: 47.5 mL    Voided: 675 mL  Total OUT: 722.5 mL    Total NET: -422.5 mL    PHYSICAL EXAM:   General: NAD, Lying in bed comfortably, alert, oriented x3  Pulm: Non-labored breathing on RA  GI/Abd: Softly distended, NT, no rebound/guarding, drain in place and collecting bile-tinged serosang fluid    MEDICATIONS (STANDING): acetaminophen   Tablet .. 650 milliGRAM(s) Oral every 6 hours  enoxaparin Injectable 40 milliGRAM(s) SubCutaneous every 24 hours  magnesium sulfate  IVPB 2 Gram(s) IV Intermittent once  pantoprazole    Tablet 40 milliGRAM(s) Oral before breakfast  rosuvastatin 40 milliGRAM(s) Oral at bedtime    MEDICATIONS (PRN):oxyCODONE    IR 5 milliGRAM(s) Oral every 4 hours PRN Moderate Pain (4 - 6)  oxyCODONE    IR 10 milliGRAM(s) Oral every 6 hours PRN Severe Pain (7 - 10)    LABS  CBC (02-18 @ 06:16)                              13.1                           7.08    )----------------(  193        --    % Neutrophils, --    % Lymphocytes, ANC: --                                  39.0    CBC (02-17 @ 07:10)                              13.2                           7.37    )----------------(  195        --    % Neutrophils, --    % Lymphocytes, ANC: --                                  39.9      BMP (02-18 @ 06:16)             140     |  98      |  8     		Ca++ --      Ca 9.6                ---------------------------------( 95    		Mg 1.8                4.1     |  31      |  0.94  			Ph 3.4     BMP (02-17 @ 07:10)             137     |  97<L>   |  8     		Ca++ --      Ca 9.1                ---------------------------------( 128<H>		Mg 1.8                4.1     |  29      |  0.85  			Ph 2.5       LFTs (02-17 @ 07:10)      TPro 6.2 / Alb 3.7 / TBili 1.1 / DBili 0.2 / AST 36 / ALT 34 / AlkPhos 49

## 2019-02-18 NOTE — PROGRESS NOTE ADULT - PROBLEM SELECTOR PLAN 1
- s/p lap subtotal cholecystectomy with bile leak  - will need ERCP with stent placement   - monitor drain output  - ppi qd  - NPO p MN for ERCP tomorrow, 2/19

## 2019-02-18 NOTE — PROGRESS NOTE ADULT - PROBLEM SELECTOR PLAN 2
- s/p lap subtotal cholecystectomy  - cont abx   - monitor drain output   - pain control prn/zofran prn   - surgery input appreciated

## 2019-02-18 NOTE — PROGRESS NOTE ADULT - ASSESSMENT
ASSESSMENT  73M s/p lap subtotal fenestrated cholecystectomy, POD#3, stable and tolerating a regular diet    PLAN:  - Cont regular diet  - Pain management prn  - F/u voids  - Monitor drain output and possible drainage around the tubing  - Monitor GI function  - F/u GI plan for ERCP  - encourage OOB and ambulation    B Team Surgery  [id number].

## 2019-02-18 NOTE — PROGRESS NOTE ADULT - SUBJECTIVE AND OBJECTIVE BOX
INTERVAL HPI/OVERNIGHT EVENTS:    doing well   pain controlled   no n/v  (+) bm/(+) flatus    MEDICATIONS  (STANDING):  acetaminophen   Tablet .. 650 milliGRAM(s) Oral every 6 hours  enoxaparin Injectable 40 milliGRAM(s) SubCutaneous every 24 hours  pantoprazole    Tablet 40 milliGRAM(s) Oral before breakfast  rosuvastatin 40 milliGRAM(s) Oral at bedtime    MEDICATIONS  (PRN):  oxyCODONE    IR 5 milliGRAM(s) Oral every 4 hours PRN Moderate Pain (4 - 6)  oxyCODONE    IR 10 milliGRAM(s) Oral every 6 hours PRN Severe Pain (7 - 10)      Allergies    Cipro (Hives)  morphine (Hives)  peanuts (Hives)    Intolerances        Review of Systems:    General:  No wt loss, fevers, chills, night sweats, fatigue   Eyes:  Good vision, no reported pain  ENT:  No sore throat, pain, runny nose, dysphagia  CV:  No pain, palpitations, hypo/hypertension  Resp:  No dyspnea, cough, tachypnea, wheezing  GI:  No pain, No nausea, No vomiting, No diarrhea, No constipation, No weight loss, No fever, No pruritis, No rectal bleeding, No melena, No dysphagia  :  No pain, bleeding, incontinence, nocturia  Muscle:  No pain, weakness  Neuro:  No weakness, tingling, memory problems  Psych:  No fatigue, insomnia, mood problems, depression  Endocrine:  No polyuria, polydypsia, cold/heat intolerance  Heme:  No petechiae, ecchymosis, easy bruisability  Skin:  No rash, tattoos, scars, edema      Vital Signs Last 24 Hrs  T(C): 36.8 (18 Feb 2019 09:26), Max: 37.7 (17 Feb 2019 14:00)  T(F): 98.2 (18 Feb 2019 09:26), Max: 99.8 (17 Feb 2019 14:00)  HR: 88 (18 Feb 2019 09:26) (65 - 88)  BP: 137/88 (18 Feb 2019 09:26) (117/64 - 141/86)  BP(mean): --  RR: 17 (18 Feb 2019 09:26) (17 - 18)  SpO2: 98% (18 Feb 2019 09:26) (96% - 100%)    PHYSICAL EXAM:    Constitutional: NAD  HEENT: EOMI, throat clear  Neck: No LAD, supple  Respiratory: CTA and P  Cardiovascular: S1 and S2, RRR, no M  Gastrointestinal: BS+, soft, NT/ND, neg HSM, +shireen  Extremities: No peripheral edema, neg clubbing, cyanosis  Vascular: 2+ peripheral pulses  Neurological: A/O x 3, no focal deficits  Psychiatric: Normal mood, normal affect  Skin: No rashes      LABS:                        13.1   7.08  )-----------( 193      ( 18 Feb 2019 06:16 )             39.0     02-18    140  |  98  |  8   ----------------------------<  95  4.1   |  31  |  0.94    Ca    9.6      18 Feb 2019 06:16  Phos  3.4     02-18  Mg     1.8     02-18    TPro  6.2  /  Alb  3.7  /  TBili  1.1  /  DBili  0.2  /  AST  36  /  ALT  34  /  AlkPhos  49  02-17          RADIOLOGY & ADDITIONAL TESTS:

## 2019-02-19 ENCOUNTER — TRANSCRIPTION ENCOUNTER (OUTPATIENT)
Age: 74
End: 2019-02-19

## 2019-02-19 LAB
ANION GAP SERPL CALC-SCNC: 11 MMO/L — SIGNIFICANT CHANGE UP (ref 7–14)
APTT BLD: 31.2 SEC — SIGNIFICANT CHANGE UP (ref 27.5–36.3)
BLD GP AB SCN SERPL QL: NEGATIVE — SIGNIFICANT CHANGE UP
BUN SERPL-MCNC: 9 MG/DL — SIGNIFICANT CHANGE UP (ref 7–23)
CALCIUM SERPL-MCNC: 9.4 MG/DL — SIGNIFICANT CHANGE UP (ref 8.4–10.5)
CHLORIDE SERPL-SCNC: 97 MMOL/L — LOW (ref 98–107)
CO2 SERPL-SCNC: 30 MMOL/L — SIGNIFICANT CHANGE UP (ref 22–31)
CREAT SERPL-MCNC: 0.84 MG/DL — SIGNIFICANT CHANGE UP (ref 0.5–1.3)
GLUCOSE SERPL-MCNC: 110 MG/DL — HIGH (ref 70–99)
HCT VFR BLD CALC: 38.5 % — LOW (ref 39–50)
HGB BLD-MCNC: 12.4 G/DL — LOW (ref 13–17)
INR BLD: 1.04 — SIGNIFICANT CHANGE UP (ref 0.88–1.17)
MAGNESIUM SERPL-MCNC: 1.9 MG/DL — SIGNIFICANT CHANGE UP (ref 1.6–2.6)
MCHC RBC-ENTMCNC: 29 PG — SIGNIFICANT CHANGE UP (ref 27–34)
MCHC RBC-ENTMCNC: 32.2 % — SIGNIFICANT CHANGE UP (ref 32–36)
MCV RBC AUTO: 90 FL — SIGNIFICANT CHANGE UP (ref 80–100)
NRBC # FLD: 0 K/UL — LOW (ref 25–125)
PHOSPHATE SERPL-MCNC: 3.3 MG/DL — SIGNIFICANT CHANGE UP (ref 2.5–4.5)
PLATELET # BLD AUTO: 206 K/UL — SIGNIFICANT CHANGE UP (ref 150–400)
PMV BLD: 9.2 FL — SIGNIFICANT CHANGE UP (ref 7–13)
POTASSIUM SERPL-MCNC: 4.2 MMOL/L — SIGNIFICANT CHANGE UP (ref 3.5–5.3)
POTASSIUM SERPL-SCNC: 4.2 MMOL/L — SIGNIFICANT CHANGE UP (ref 3.5–5.3)
PROTHROM AB SERPL-ACNC: 11.6 SEC — SIGNIFICANT CHANGE UP (ref 9.8–13.1)
RBC # BLD: 4.28 M/UL — SIGNIFICANT CHANGE UP (ref 4.2–5.8)
RBC # FLD: 13.4 % — SIGNIFICANT CHANGE UP (ref 10.3–14.5)
RH IG SCN BLD-IMP: POSITIVE — SIGNIFICANT CHANGE UP
SODIUM SERPL-SCNC: 138 MMOL/L — SIGNIFICANT CHANGE UP (ref 135–145)
WBC # BLD: 6.08 K/UL — SIGNIFICANT CHANGE UP (ref 3.8–10.5)
WBC # FLD AUTO: 6.08 K/UL — SIGNIFICANT CHANGE UP (ref 3.8–10.5)

## 2019-02-19 RX ADMIN — ROSUVASTATIN CALCIUM 40 MILLIGRAM(S): 5 TABLET ORAL at 21:38

## 2019-02-19 RX ADMIN — OXYCODONE HYDROCHLORIDE 10 MILLIGRAM(S): 5 TABLET ORAL at 22:57

## 2019-02-19 RX ADMIN — Medication 650 MILLIGRAM(S): at 06:43

## 2019-02-19 RX ADMIN — Medication 650 MILLIGRAM(S): at 06:15

## 2019-02-19 RX ADMIN — Medication 650 MILLIGRAM(S): at 16:54

## 2019-02-19 RX ADMIN — Medication 650 MILLIGRAM(S): at 00:30

## 2019-02-19 RX ADMIN — OXYCODONE HYDROCHLORIDE 10 MILLIGRAM(S): 5 TABLET ORAL at 08:49

## 2019-02-19 RX ADMIN — OXYCODONE HYDROCHLORIDE 10 MILLIGRAM(S): 5 TABLET ORAL at 09:40

## 2019-02-19 RX ADMIN — OXYCODONE HYDROCHLORIDE 10 MILLIGRAM(S): 5 TABLET ORAL at 16:23

## 2019-02-19 RX ADMIN — PANTOPRAZOLE SODIUM 40 MILLIGRAM(S): 20 TABLET, DELAYED RELEASE ORAL at 06:15

## 2019-02-19 RX ADMIN — OXYCODONE HYDROCHLORIDE 10 MILLIGRAM(S): 5 TABLET ORAL at 16:38

## 2019-02-19 RX ADMIN — OXYCODONE HYDROCHLORIDE 10 MILLIGRAM(S): 5 TABLET ORAL at 22:27

## 2019-02-19 RX ADMIN — Medication 650 MILLIGRAM(S): at 16:24

## 2019-02-19 NOTE — DISCHARGE NOTE ADULT - PATIENT PORTAL LINK FT
You can access the [facility] Patient Portal, offered by [facility], by registering with the following website: [email or web address]

## 2019-02-19 NOTE — DISCHARGE NOTE ADULT - MEDICATION SUMMARY - MEDICATIONS TO TAKE
I will START or STAY ON the medications listed below when I get home from the hospital:    acetaminophen 325 mg oral tablet  -- 2 tab(s) by mouth every 6 hours, As Needed  -- Indication: For Surgical pain    oxyCODONE 5 mg oral tablet  -- 1 tab(s) by mouth every 6 hours -for moderate pain - for severe pain MDD:4  -- Indication: For Surgical pain    rosuvastatin 20 mg oral tablet  -- 1 tab(s) by mouth once a day  -- Indication: For HLD

## 2019-02-19 NOTE — PROGRESS NOTE ADULT - ASSESSMENT
ASSESSMENT  73M s/p lap subtotal fenestrated cholecystectomy, POD#3, stable and tolerating a regular diet    PLAN:  - f/u GI; planned for ERCP today  - NPO for procedure, will adv post procedure as tolerated  - Pain management prn  - Monitor I/Os  - Monitor GI function  - encourage OOB and ambulation    B Team Surgery  [id number].

## 2019-02-19 NOTE — PROGRESS NOTE ADULT - SUBJECTIVE AND OBJECTIVE BOX
B Team Surgery Progress Note    Interval: No acute overnight events.    SUBJECTIVE: Patient seen and examined at the bedside. Feeling well this morning. Pain is well controlled. Ambulating well.     VITALS  T(C): 37.1 (02-19-19 @ 06:12), Max: 38.2 (02-18-19 @ 17:55)  HR: 66 (02-19-19 @ 06:12) (66 - 88)  BP: 119/62 (02-19-19 @ 06:12) (119/62 - 144/81)  ABP: --  ABP(mean): --  RR: 17 (02-19-19 @ 06:12) (17 - 18)  SpO2: 100% (02-19-19 @ 06:12) (96% - 100%)  Wt(kg): --  CVP(mm Hg): --      02-18 @ 07:01  -  02-19 @ 07:00  --------------------------------------------------------  IN:    dextrose 5% + sodium chloride 0.45%: 600 mL    Oral Fluid: 120 mL  Total IN: 720 mL    OUT:    Bulb: 20 mL    Voided: 1825 mL  Total OUT: 1845 mL    Total NET: -1125 mL        PHYSICAL EXAM:   General: NAD, Lying in bed comfortably, alert, oriented x3  Pulm: Non-labored breathing on RA  GI/Abd: Softly distended, NT, no rebound/guarding, drain in place and collecting bile-tinged serosang fluid    MEDICATIONS  (STANDING):  acetaminophen   Tablet .. 650 milliGRAM(s) Oral every 6 hours  dextrose 5% + sodium chloride 0.45% 1000 milliLiter(s) (100 mL/Hr) IV Continuous <Continuous>  enoxaparin Injectable 40 milliGRAM(s) SubCutaneous every 24 hours  pantoprazole    Tablet 40 milliGRAM(s) Oral before breakfast  rosuvastatin 40 milliGRAM(s) Oral at bedtime    MEDICATIONS  (PRN):  oxyCODONE    IR 5 milliGRAM(s) Oral every 4 hours PRN Moderate Pain (4 - 6)  oxyCODONE    IR 10 milliGRAM(s) Oral every 6 hours PRN Severe Pain (7 - 10)      LABS  CBC (02-19 @ 07:00)                              12.4<L>                         6.08    )----------------(  206        --    % Neutrophils, --    % Lymphocytes, ANC: --                                  38.5<L>              CBC (02-18 @ 06:16)                              13.1                           7.08    )----------------(  193        --    % Neutrophils, --    % Lymphocytes, ANC: --                                  39.0                  BMP (02-19 @ 07:00)             138     |  97<L>   |  9     		Ca++ --      Ca 9.4                ---------------------------------( 110<H>		Mg 1.9                4.2     |  30      |  0.84  			Ph 3.3     BMP (02-18 @ 06:16)             140     |  98      |  8     		Ca++ --      Ca 9.6                ---------------------------------( 95    		Mg 1.8                4.1     |  31      |  0.94  			Ph 3.4         Coags (02-19 @ 07:00)  aPTT 31.2 / INR 1.04 / PT 11.6

## 2019-02-19 NOTE — DISCHARGE NOTE ADULT - CONDITIONS AT DISCHARGE
Patient alert and oriented times 4. Vitals signs stable. Out of bed, ambulating independently. Tolerating low fat diet. Abd. lap sites *4 with dermabond c/d/i.  Patient discharged to home. Discharge instructions given and understood. IV removed. Patient alert and oriented times 4. Vitals signs stable. Out of bed, ambulating independently. Tolerating low fat diet. Abd. lap sites *4 with dermabond c/d/i.  RLQ YOUSUF draining scant yellow fluid. Pt able to open, empty and close YOUSUF independently. Initial supplies given. Patient discharged to home. Discharge instructions given and understood. IV removed.

## 2019-02-19 NOTE — DISCHARGE NOTE ADULT - CARE PROVIDER_API CALL
Orion Santillan)  Surgery; Surgical Critical Care  [id number] [address] [place] [address]  Phone: [phone]  Fax: [phone]  Follow Up Time: Orion Santillan (MD)  Surgery; Surgical Critical Care  [id number] [place] [address]  Phone: [phone]  Fax: [phone]  Follow Up Time:     Boone Chou (DO)  Gastroenterology; Internal Medicine  [address] [place] [address]  Phone: [phone]  Fax: [phone]  Follow Up Time: Orion Santillan)  Surgery; Surgical Critical Care  [address] [place] [address]  Phone: [phone]  Fax: [phone]  Follow Up Time:     Mahesh Otero ()  Gastroenterology; Internal Medicine  [address] [place] [address]  Phone: [phone]  Fax: [phone]  Follow Up Time:

## 2019-02-19 NOTE — DISCHARGE NOTE ADULT - HOSPITAL COURSE
74 yo male with medical h/o HTN, HDL and chronic cholecystitis, diagnosed 12/2018. Pt was treated at [facility] at Interventional radiology and has drain placement, right upper abdominal. Patient presented to [facility] 2/15 for scheduled surgical intervention.     Patient was taken to the operating room and underwent Laparoscopic subtotal fenestrating cholecystectomy. Can drain in gallbladder fossa. 74 yo male with medical h/o HTN, HDL and chronic cholecystitis, diagnosed 12/2018. Pt was treated at [facility] at Interventional radiology and has drain placement, right upper abdominal. Patient presented to [facility] 2/15 for scheduled surgical intervention.     Patient was taken to the operating room and underwent laparoscopic subtotal fenestrating cholecystectomy. Can drain in gallbladder fossa. Pt tolerated procedure well, without complication. Pt remained hemodynamically stable in the PACU and transferred to the surgical floor.     Pain control was transitioned from IV to PO pain meds. Pt currently ambulating, voiding, tolerating a regular diet, with pain well controlled on PO pain meds. Patient is stable for discharge home to follow up as an outpatient, instructed to call to schedule appointment. 74 yo male with medical h/o HTN, HDL and chronic cholecystitis, diagnosed 12/2018. Pt was treated at [facility] at Interventional radiology and has drain placement, right upper abdominal. Patient presented to [facility] 2/15 for scheduled surgical intervention.     Patient was taken to the operating room and underwent laparoscopic subtotal fenestrating cholecystectomy. Can drain in gallbladder fossa. Pt tolerated procedure well, without complication. Pt remained hemodynamically stable in the PACU and transferred to the surgical floor.     Patient underwent an ERCP on 2/19/19 with Dr. Otero     Pain control was transitioned from IV to PO pain meds. Pt currently ambulating, voiding, tolerating a regular diet, with pain well controlled on PO pain meds. Patient is stable for discharge home to follow up as an outpatient, instructed to call to schedule appointment. 72 yo male with medical h/o HTN, HDL and chronic cholecystitis, diagnosed 12/2018. Pt was treated at [facility] at Interventional radiology and has drain placement, right upper abdominal. Patient presented to [facility] 2/15 for scheduled surgical intervention.     Patient was taken to the operating room and underwent laparoscopic subtotal fenestrating cholecystectomy. Can drain in gallbladder fossa. Pt tolerated procedure well, without complication. Pt remained hemodynamically stable in the PACU and transferred to the surgical floor.     Patient underwent an ERCP on 2/19/19 with Dr. Otero with sphincterotomy and stent placement. He tolerated the procedure well and restarted on a diet. Pain control was transitioned from IV to PO pain meds. Pt currently ambulating, voiding, tolerating a regular diet, with pain well controlled on PO pain meds. Patient is stable for discharge home to follow up as an outpatient with Dr. Santillan and with Dr. Otero, instructed to call to schedule appointments.

## 2019-02-19 NOTE — CHART NOTE - NSCHARTNOTEFT_GEN_A_CORE
Post-operative Check    SUBJECTIVE: No acute events in the immediate post-operative period. Pain well controlled.     OBJECTIVE:  T(C): 37.2 (02-19-19 @ 17:52), Max: 37.3 (02-18-19 @ 22:30)  HR: 78 (02-19-19 @ 17:52) (63 - 78)  BP: 138/84 (02-19-19 @ 17:52) (118/72 - 144/81)  RR: 17 (02-19-19 @ 17:52) (17 - 18)  SpO2: 96% (02-19-19 @ 17:52) (96% - 100%)      02-18-19 @ 07:01  -  02-19-19 @ 07:00  --------------------------------------------------------  IN: 720 mL / OUT: 1845 mL / NET: -1125 mL    02-19-19 @ 07:01  -  02-19-19 @ 18:57  --------------------------------------------------------  IN: 400 mL / OUT: 607 mL / NET: -207 mL        Physical Exam:   - Constitutional: AOx3, NAD  - CV: normotensive, regular rate   - Respiratory: nonlabored, no accessory muscle use  - Abdomen: soft, nondistended, mild tenderness to palpation in epigastrum and RUQ, incisions c/d/i, RUQ drain ss output   - Extremities: WWP  - Vascular: Distal pusles 2+  - Neurological: no focal deficits    ASSESSMENT:   SUSAN LONG is a 73y Male s/p ERCP stent placement POD#0, stable in perioperative period.    PLAN:  - Pain management  - Follow UOP  - Regular diet  - Morning labs    Surgery B   [id number]

## 2019-02-19 NOTE — DISCHARGE NOTE ADULT - CARE PLAN
Principal Discharge DX:	Chronic cholecystitis  Goal:	Improvement of symptoms  Assessment and plan of treatment:	WOUND CARE:  Please keep incisions clean and dry. Please do not Scrub or rub incisions. Do not use lotion or powder on incisions.   BATHING: Please do not submerge wound underwater. You may shower and/or sponge bathe.  ACTIVITY: No heavy lifting or straining. Otherwise, you may return to your usual level of physical activity. If you are taking narcotic pain medication (such as Percocet) DO NOT drive a car, operate machinery or make important decisions.  DIET: Return to your usual diet.  NOTIFY YOUR SURGEON IF: You have any bleeding that does not stop, any pus draining from your wound(s), any fever (over 100.4 F) or chills, persistent nausea/vomiting, persistent diarrhea, or if your pain is not controlled on your discharge pain medications.  FOLLOW-UP: Please follow up with your primary care physician in one week regarding your hospitalization. Please follow-up with your surgeon, Dr. Santillan within 7 days following discharge- please call to schedule an appointment. Principal Discharge DX:	Chronic cholecystitis  Goal:	Improvement of symptoms  Assessment and plan of treatment:	WOUND CARE:    YOUSUF: You will be discharged with a YOUSUF drain. You will need to empty it and record outputs accurately. This will be taught to you by the nursing staff. Please do not remove the YOUSUF drain. It will be removed in the office. Please bring to the office accurate records of output.  BATHING: Please do not submerge wound underwater. You may shower and/or sponge bathe.  ACTIVITY: No heavy lifting or straining. Otherwise, you may return to your usual level of physical activity. If you are taking narcotic pain medication (such as Percocet) DO NOT drive a car, operate machinery or make important decisions.  DIET: Return to your usual diet.  NOTIFY YOUR SURGEON IF: You have any bleeding that does not stop, any pus draining from your wound(s), any fever (over 100.4 F) or chills, persistent nausea/vomiting, persistent diarrhea, or if your pain is not controlled on your discharge pain medications.  FOLLOW-UP: Please follow up with your primary care physician in one week regarding your hospitalization. Please follow-up with your surgeon, Dr. Santillan within 7 days following discharge- please call to schedule an appointment. Principal Discharge DX:	Chronic cholecystitis  Goal:	Improvement of symptoms  Assessment and plan of treatment:	WOUND CARE:    YOUSUF: You will be discharged with a YOUSUF drain. You will need to empty it and record outputs accurately. This will be taught to you by the nursing staff. Please do not remove the YOUSUF drain. It will be removed in the office. Please bring to the office accurate records of output.  BATHING: Please do not submerge wound underwater. You may shower and/or sponge bathe.  ACTIVITY: No heavy lifting or straining. Otherwise, you may return to your usual level of physical activity. If you are taking narcotic pain medication (such as Percocet) DO NOT drive a car, operate machinery or make important decisions.  DIET: Return to your usual diet.  NOTIFY YOUR SURGEON IF: You have any bleeding that does not stop, any pus draining from your wound(s), any fever (over 100.4 F) or chills, persistent nausea/vomiting, persistent diarrhea, or if your pain is not controlled on your discharge pain medications.  FOLLOW-UP: Please follow up with your primary care physician in one week regarding your hospitalization. Please follow-up with your surgeon, Dr. Santillan within 7 days following discharge- please call to schedule an appointment.  Secondary Diagnosis:	Bile leak, postoperative  Goal:	s/p ERCP and stent placement  Assessment and plan of treatment:	- Follow-up with the gastroenterologist within 2 weeks following discharge, call [phone] to schedule. Principal Discharge DX:	Chronic cholecystitis  Goal:	Improvement of symptoms  Assessment and plan of treatment:	WOUND CARE:    YOUSUF: You will be discharged with a YOUSUF drain. You will need to empty it and record outputs accurately. This will be taught to you by the nursing staff. Please do not remove the YOUSUF drain. It will be removed in the office. Please bring to the office accurate records of output.  BATHING: Please do not submerge wound underwater. You may shower and/or sponge bathe.  ACTIVITY: No heavy lifting or straining. Otherwise, you may return to your usual level of physical activity. If you are taking narcotic pain medication (such as Percocet) DO NOT drive a car, operate machinery or make important decisions.  DIET: Return to your usual diet.  NOTIFY YOUR SURGEON IF: You have any bleeding that does not stop, any pus draining from your wound(s), any fever (over 100.4 F) or chills, persistent nausea/vomiting, persistent diarrhea, or if your pain is not controlled on your discharge pain medications.  FOLLOW-UP: Please follow up with your primary care physician in one week regarding your hospitalization. Please follow-up with your surgeon, Dr. Santillan within 7 days following discharge- please call to schedule an appointment.  Secondary Diagnosis:	Bile leak, postoperative  Goal:	s/p ERCP and stent placement  Assessment and plan of treatment:	- Follow-up with the gastroenterologist Dr. Otero within 2 weeks following discharge, call [phone] to schedule.

## 2019-02-19 NOTE — DISCHARGE NOTE ADULT - PLAN OF CARE
Improvement of symptoms WOUND CARE:  Please keep incisions clean and dry. Please do not Scrub or rub incisions. Do not use lotion or powder on incisions.   BATHING: Please do not submerge wound underwater. You may shower and/or sponge bathe.  ACTIVITY: No heavy lifting or straining. Otherwise, you may return to your usual level of physical activity. If you are taking narcotic pain medication (such as Percocet) DO NOT drive a car, operate machinery or make important decisions.  DIET: Return to your usual diet.  NOTIFY YOUR SURGEON IF: You have any bleeding that does not stop, any pus draining from your wound(s), any fever (over 100.4 F) or chills, persistent nausea/vomiting, persistent diarrhea, or if your pain is not controlled on your discharge pain medications.  FOLLOW-UP: Please follow up with your primary care physician in one week regarding your hospitalization. Please follow-up with your surgeon, Dr. Santillan within 7 days following discharge- please call to schedule an appointment. WOUND CARE:    YOUSUF: You will be discharged with a YOUSUF drain. You will need to empty it and record outputs accurately. This will be taught to you by the nursing staff. Please do not remove the YOUSUF drain. It will be removed in the office. Please bring to the office accurate records of output.  BATHING: Please do not submerge wound underwater. You may shower and/or sponge bathe.  ACTIVITY: No heavy lifting or straining. Otherwise, you may return to your usual level of physical activity. If you are taking narcotic pain medication (such as Percocet) DO NOT drive a car, operate machinery or make important decisions.  DIET: Return to your usual diet.  NOTIFY YOUR SURGEON IF: You have any bleeding that does not stop, any pus draining from your wound(s), any fever (over 100.4 F) or chills, persistent nausea/vomiting, persistent diarrhea, or if your pain is not controlled on your discharge pain medications.  FOLLOW-UP: Please follow up with your primary care physician in one week regarding your hospitalization. Please follow-up with your surgeon, Dr. Santillan within 7 days following discharge- please call to schedule an appointment. s/p ERCP and stent placement - Follow-up with the gastroenterologist within 2 weeks following discharge, call [phone] to schedule. - Follow-up with the gastroenterologist Dr. Otero within 2 weeks following discharge, call [phone] to schedule.

## 2019-02-19 NOTE — DISCHARGE NOTE ADULT - PROVIDER TOKENS
PROVIDER:[TOKEN:[[id number]]] PROVIDER:[TOKEN:[[id number]]],PROVIDER:[TOKEN:[[id number]]] PROVIDER:[TOKEN:[[id number]]],PROVIDER:[TOKEN:[[id number]]]

## 2019-02-19 NOTE — DISCHARGE NOTE ADULT - ADDITIONAL INSTRUCTIONS
Please follow-up with your surgeon, Dr. Santillan within 7 days following discharge- please call to schedule an appointment.    Please follow up with Interventional radiology to have your drain evaluated one week from discharge.  Please call today, to schedule an appointment (for one week from discharge date) [phone].   Location is in [facility] on the second floor radiology Room 263. You can park at [place]. Please follow-up with your surgeon, Dr. Santillan within 7 days following discharge- please call to schedule an appointment. Follow-up with your surgeon, Dr. Santillan within 7 days following discharge- please call to schedule an appointment.    Follow-up with the gastroenterologist within 2 weeks following discharge, call [phone] to schedule.

## 2019-02-19 NOTE — DISCHARGE NOTE ADULT - INSTRUCTIONS
Please continue a regular diet Report of any redness, drainage, swelling, fever or pain not relieved by pain medication. Report of nausea or vomiting or severe abdominal pain, not managed with discharged pain meds. Report of any redness, drainage, swelling, fever or pain not relieved by pain medication. Report of nausea or vomiting or severe abdominal pain, not managed with discharged pain meds.  Please empty drain at least twice a day depending on output and write it down on a piece of paper with amount, date and time for follow up.

## 2019-02-20 LAB
ALBUMIN SERPL ELPH-MCNC: 3.4 G/DL — SIGNIFICANT CHANGE UP (ref 3.3–5)
ALP SERPL-CCNC: 61 U/L — SIGNIFICANT CHANGE UP (ref 40–120)
ALT FLD-CCNC: 26 U/L — SIGNIFICANT CHANGE UP (ref 4–41)
AMYLASE P1 CFR SERPL: 67 U/L — SIGNIFICANT CHANGE UP (ref 25–125)
ANION GAP SERPL CALC-SCNC: 11 MMO/L — SIGNIFICANT CHANGE UP (ref 7–14)
AST SERPL-CCNC: 36 U/L — SIGNIFICANT CHANGE UP (ref 4–40)
BILIRUB SERPL-MCNC: 0.6 MG/DL — SIGNIFICANT CHANGE UP (ref 0.2–1.2)
BUN SERPL-MCNC: 6 MG/DL — LOW (ref 7–23)
CALCIUM SERPL-MCNC: 9.3 MG/DL — SIGNIFICANT CHANGE UP (ref 8.4–10.5)
CHLORIDE SERPL-SCNC: 99 MMOL/L — SIGNIFICANT CHANGE UP (ref 98–107)
CO2 SERPL-SCNC: 28 MMOL/L — SIGNIFICANT CHANGE UP (ref 22–31)
CREAT SERPL-MCNC: 0.83 MG/DL — SIGNIFICANT CHANGE UP (ref 0.5–1.3)
GLUCOSE SERPL-MCNC: 99 MG/DL — SIGNIFICANT CHANGE UP (ref 70–99)
HCT VFR BLD CALC: 37.9 % — LOW (ref 39–50)
HGB BLD-MCNC: 12.6 G/DL — LOW (ref 13–17)
LIDOCAIN IGE QN: 49.9 U/L — SIGNIFICANT CHANGE UP (ref 7–60)
MAGNESIUM SERPL-MCNC: 1.7 MG/DL — SIGNIFICANT CHANGE UP (ref 1.6–2.6)
MCHC RBC-ENTMCNC: 29.8 PG — SIGNIFICANT CHANGE UP (ref 27–34)
MCHC RBC-ENTMCNC: 33.2 % — SIGNIFICANT CHANGE UP (ref 32–36)
MCV RBC AUTO: 89.6 FL — SIGNIFICANT CHANGE UP (ref 80–100)
NRBC # FLD: 0 K/UL — LOW (ref 25–125)
PHOSPHATE SERPL-MCNC: 3.5 MG/DL — SIGNIFICANT CHANGE UP (ref 2.5–4.5)
PLATELET # BLD AUTO: 220 K/UL — SIGNIFICANT CHANGE UP (ref 150–400)
PMV BLD: 9.1 FL — SIGNIFICANT CHANGE UP (ref 7–13)
POTASSIUM SERPL-MCNC: 4.2 MMOL/L — SIGNIFICANT CHANGE UP (ref 3.5–5.3)
POTASSIUM SERPL-SCNC: 4.2 MMOL/L — SIGNIFICANT CHANGE UP (ref 3.5–5.3)
PROT SERPL-MCNC: 6.3 G/DL — SIGNIFICANT CHANGE UP (ref 6–8.3)
RBC # BLD: 4.23 M/UL — SIGNIFICANT CHANGE UP (ref 4.2–5.8)
RBC # FLD: 13.2 % — SIGNIFICANT CHANGE UP (ref 10.3–14.5)
SODIUM SERPL-SCNC: 138 MMOL/L — SIGNIFICANT CHANGE UP (ref 135–145)
WBC # BLD: 6.72 K/UL — SIGNIFICANT CHANGE UP (ref 3.8–10.5)
WBC # FLD AUTO: 6.72 K/UL — SIGNIFICANT CHANGE UP (ref 3.8–10.5)

## 2019-02-20 RX ORDER — SENNA PLUS 8.6 MG/1
1 TABLET ORAL
Qty: 0 | Refills: 0 | Status: DISCONTINUED | OUTPATIENT
Start: 2019-02-20 | End: 2019-02-21

## 2019-02-20 RX ORDER — HYDROMORPHONE HYDROCHLORIDE 2 MG/ML
0.5 INJECTION INTRAMUSCULAR; INTRAVENOUS; SUBCUTANEOUS EVERY 4 HOURS
Qty: 0 | Refills: 0 | Status: DISCONTINUED | OUTPATIENT
Start: 2019-02-20 | End: 2019-02-21

## 2019-02-20 RX ORDER — DOCUSATE SODIUM 100 MG
100 CAPSULE ORAL DAILY
Qty: 0 | Refills: 0 | Status: DISCONTINUED | OUTPATIENT
Start: 2019-02-20 | End: 2019-02-21

## 2019-02-20 RX ORDER — ACETAMINOPHEN 500 MG
2 TABLET ORAL
Qty: 0 | Refills: 0 | COMMUNITY
Start: 2019-02-20

## 2019-02-20 RX ORDER — OXYCODONE HYDROCHLORIDE 5 MG/1
1 TABLET ORAL
Qty: 12 | Refills: 0 | OUTPATIENT
Start: 2019-02-20 | End: 2019-02-22

## 2019-02-20 RX ORDER — MAGNESIUM SULFATE 500 MG/ML
2 VIAL (ML) INJECTION ONCE
Qty: 0 | Refills: 0 | Status: COMPLETED | OUTPATIENT
Start: 2019-02-20 | End: 2019-02-20

## 2019-02-20 RX ADMIN — OXYCODONE HYDROCHLORIDE 10 MILLIGRAM(S): 5 TABLET ORAL at 17:42

## 2019-02-20 RX ADMIN — Medication 650 MILLIGRAM(S): at 11:46

## 2019-02-20 RX ADMIN — ROSUVASTATIN CALCIUM 40 MILLIGRAM(S): 5 TABLET ORAL at 21:49

## 2019-02-20 RX ADMIN — Medication 650 MILLIGRAM(S): at 07:55

## 2019-02-20 RX ADMIN — Medication 50 GRAM(S): at 10:51

## 2019-02-20 RX ADMIN — ENOXAPARIN SODIUM 40 MILLIGRAM(S): 100 INJECTION SUBCUTANEOUS at 06:43

## 2019-02-20 RX ADMIN — Medication 650 MILLIGRAM(S): at 12:06

## 2019-02-20 RX ADMIN — Medication 650 MILLIGRAM(S): at 17:42

## 2019-02-20 RX ADMIN — Medication 650 MILLIGRAM(S): at 01:48

## 2019-02-20 RX ADMIN — Medication 100 MILLIGRAM(S): at 13:42

## 2019-02-20 RX ADMIN — Medication 650 MILLIGRAM(S): at 06:43

## 2019-02-20 RX ADMIN — Medication 650 MILLIGRAM(S): at 02:33

## 2019-02-20 RX ADMIN — HYDROMORPHONE HYDROCHLORIDE 0.5 MILLIGRAM(S): 2 INJECTION INTRAMUSCULAR; INTRAVENOUS; SUBCUTANEOUS at 22:15

## 2019-02-20 RX ADMIN — Medication 650 MILLIGRAM(S): at 17:12

## 2019-02-20 RX ADMIN — OXYCODONE HYDROCHLORIDE 10 MILLIGRAM(S): 5 TABLET ORAL at 17:12

## 2019-02-20 RX ADMIN — HYDROMORPHONE HYDROCHLORIDE 0.5 MILLIGRAM(S): 2 INJECTION INTRAMUSCULAR; INTRAVENOUS; SUBCUTANEOUS at 22:12

## 2019-02-20 RX ADMIN — PANTOPRAZOLE SODIUM 40 MILLIGRAM(S): 20 TABLET, DELAYED RELEASE ORAL at 06:43

## 2019-02-20 NOTE — PROGRESS NOTE ADULT - PROBLEM SELECTOR PLAN 1
- s/p lap subtotal cholecystectomy with bile leak  - monitor drain output  - ppi qd  - s/p ERCP 2/20 with sphincterotomy and Stent placement   - will need repeat ERCP for stent removal in 8 weeks; discussed with patient and son who are aware

## 2019-02-20 NOTE — PROGRESS NOTE ADULT - SUBJECTIVE AND OBJECTIVE BOX
INTERVAL HPI/OVERNIGHT EVENTS:    son bedside  tolerating po intake  mild right abdominal pain this morning  no n/v    MEDICATIONS  (STANDING):  acetaminophen   Tablet .. 650 milliGRAM(s) Oral every 6 hours  enoxaparin Injectable 40 milliGRAM(s) SubCutaneous every 24 hours  pantoprazole    Tablet 40 milliGRAM(s) Oral before breakfast  rosuvastatin 40 milliGRAM(s) Oral at bedtime    MEDICATIONS  (PRN):  docusate sodium 100 milliGRAM(s) Oral daily PRN Constipation  oxyCODONE    IR 5 milliGRAM(s) Oral every 4 hours PRN Moderate Pain (4 - 6)  oxyCODONE    IR 10 milliGRAM(s) Oral every 6 hours PRN Severe Pain (7 - 10)  senna 1 Tablet(s) Oral two times a day PRN Constipation      Allergies    Cipro (Hives)  morphine (Hives)  peanuts (Hives)    Intolerances        Review of Systems:    General:  No wt loss, fevers, chills, night sweats, fatigue   Eyes:  Good vision, no reported pain  ENT:  No sore throat, pain, runny nose, dysphagia  CV:  No pain, palpitations, hypo/hypertension  Resp:  No dyspnea, cough, tachypnea, wheezing  GI:  No pain, No nausea, No vomiting, No diarrhea, No constipation, No weight loss, No fever, No pruritis, No rectal bleeding, No melena, No dysphagia  :  No pain, bleeding, incontinence, nocturia  Muscle:  No pain, weakness  Neuro:  No weakness, tingling, memory problems  Psych:  No fatigue, insomnia, mood problems, depression  Endocrine:  No polyuria, polydypsia, cold/heat intolerance  Heme:  No petechiae, ecchymosis, easy bruisability  Skin:  No rash, tattoos, scars, edema      Vital Signs Last 24 Hrs  T(C): 36.6 (20 Feb 2019 10:04), Max: 37.3 (20 Feb 2019 02:35)  T(F): 97.9 (20 Feb 2019 10:04), Max: 99.1 (20 Feb 2019 02:35)  HR: 64 (20 Feb 2019 10:04) (60 - 78)  BP: 129/71 (20 Feb 2019 10:04) (126/76 - 150/86)  BP(mean): --  RR: 18 (20 Feb 2019 10:04) (17 - 18)  SpO2: 98% (20 Feb 2019 10:04) (95% - 98%)    PHYSICAL EXAM:    Constitutional: NAD  HEENT: EOMI, throat clear  Neck: No LAD, supple  Respiratory: CTA and P  Cardiovascular: S1 and S2, RRR, no M  Gastrointestinal: BS+, soft, NT/ND, neg HSM,  Extremities: No peripheral edema, neg clubbing, cyanosis  Vascular: 2+ peripheral pulses  Neurological: A/O x 3, no focal deficits  Psychiatric: Normal mood, normal affect  Skin: No rashes      LABS:                        12.6   6.72  )-----------( 220      ( 20 Feb 2019 06:20 )             37.9     02-20    138  |  99  |  6<L>  ----------------------------<  99  4.2   |  28  |  0.83    Ca    9.3      20 Feb 2019 06:20  Phos  3.5     02-20  Mg     1.7     02-20    TPro  6.3  /  Alb  3.4  /  TBili  0.6  /  DBili  x   /  AST  36  /  ALT  26  /  AlkPhos  61  02-20    PT/INR - ( 19 Feb 2019 07:00 )   PT: 11.6 SEC;   INR: 1.04          PTT - ( 19 Feb 2019 07:00 )  PTT:31.2 SEC      RADIOLOGY & ADDITIONAL TESTS:    < from: ERCP (02.19.19 @ 12:13) >    [facility]  _______________________________________________________________________________  Patient Name: Iván Melendez         Procedure Date: 2/19/2019 12:13 PM  MRN: [id number]                     Account Number: [id number]  YOB: 1945             Admit Type: Inpatient  Room: [place] [address]                         Gender: Male  Attending MD: JOSÉ MIGUEL JADE , DO     _______________________________________________________________________________     Procedure:           ERCP  Indications:         Bile leak  Providers:           JOSÉ MIGUEL JADE DO  Medicines:           Sedation Required Anesthesia Staff Assistance  Complications:       No immediate complications.  Procedure:           Pre-Anesthesia Assessment:                       - Prior to the procedure, a History and Physical was                        performed, and patient medications and allergies were                        reviewed. The patient is competent. The risks and        benefits of the procedure and the sedation options and                        risks were discussed with the patient. All questions                        were answered and informed consent was obtained. Patient                        identification and proposed procedure were verified by                        the physician, the nurse and the anesthesiologist in the                        pre-procedure area in the procedure room. Mental Status                        Examination: alert and oriented. Airway Examination:                        normal oropharyngeal airway and neck mobility.                        Respiratory Examination: clear to auscultation. CV                        Examination: normal. Prophylactic Antibiotics: The                    patient does not require prophylactic antibiotics. Prior                        Anticoagulants: The patient has taken no previous                        anticoagulant or antiplatelet agents. ASA Grade                        Assessment: III - A patient with severe systemic                        disease. After reviewing the risks and benefits, the                        patient was deemed in satisfactory condition to undergo                        the procedure. The anesthesia plan was to use moderate                        sedation / analgesia (conscious sedation). Immediately                        prior to administration of medications, the patient was                        re-assessed for adequacy to receive sedatives. The heart                        rate, respiratory rate, oxygen saturations, blood                        pressure, adequacy of pulmonary ventilation, and                        response to care were monitored throughout the                        procedure. The physical status of the patient was                        re-assessed after the procedure.                       After obtaining informed consent, the scope was passed                        under direct vision. Throughout the procedure, the                       patient's blood pressure, pulse, and oxygen saturations                        were monitored continuously. The ERCP was introduced                        through the mouth, and advanced to the duodenum and used          to inject contrast into the bile duct. The ERCP was                        accomplished without difficulty. The patient tolerated                        the procedure well.             Findings:       A  film of the abdomen was obtained. Surgical clips were seen in        the area of the right upper quadrant of the abdomen. One percutaneous        drain ending in the gallbladder was seen. The major papilla was normal.        A 12 mm biliary sphincterotomy was made with a braided Autotome        sphincterotome using blended current. There was no post-sphincterotomy        bleeding. To discover objects, the biliary tree was swept with a 9 mm        balloon starting at the upper third of the main bile duct, middle third        of the main bile duct, lower third of the main duct, cystic duct, left        intrahepatic duct(s) and right intrahepatic duct(s). Mucus was swept        from the duct. Debris was swept from the duct. One 5 Fr by 10 cm        absorbable stent with a single external flap and two internal flaps was        placed 4 cm into the common bile duct. Bile flowed through the stent.        The stent was in good position.                                                           Impression:          - The major papilla appeared normal.                       - A sphincterotomy was performed.                       - The biliary tree was swept and mucus and debris were                       found.                       - One absorbable stent was placed into the common bile                        duct.  Recommendation:      - Resume regular diet.                       - Observe patient's clinical course.          - Continue present medications.                                                                                   Attending Participation:       I personally performed the entire procedure.                                 ____________________  JOSÉ MIGUEL JADE DO  2/19/2019 2:15:53 PM  This report has been signed electronically.  Number of Addenda: 0    Note Initiated On: 2/19/2019 12:13 PM    < end of copied text >

## 2019-02-20 NOTE — PROGRESS NOTE ADULT - ATTENDING COMMENTS
I saw and examined the patient and agree with the above note.    s/p lap subtotal cholecystectomy  -ercp and biliary stent placement for tomorrow  -ambulation encouraged    Orion Santillan MD (Cell: [phone])  Acute and Critical Care Surgery    The Acute Care Surgery (B Team) Attending Group Practice:  Dr. Gómez Lindsey, Dr. Nabeel Rincon, Dr. Teresa Mcgraw, Dr. Orion Santillan    Urgent issues - spectra [phone] or [phone]  Nonurgent issues - [phone]  Patient appointments or after hours - [phone]
I saw and examined the patient and agree with the above note.    s/p lap subtotal cholecystectomy  -ercp and biliary stent placement today  -ambulation encouraged    Orion Santillan MD (Cell: [phone])  Acute and Critical Care Surgery    The Acute Care Surgery (B Team) Attending Group Practice:  Dr. Gómez Lindsey, Dr. Nabeel Rincon, Dr. Teresa Mcgraw, Dr. Orion Santillan    Urgent issues - spectra [phone] or [phone]  Nonurgent issues - [phone]  Patient appointments or after hours - [phone] .
Seen and examined, chart and note reviewed, case dsicussed with B team    s/p lap subtotal fenestrating cholecystectomy  a.  Bile leak noted as expected, Consult GI  b.  Diet as tolerated  c.  Possible ERCP to be determined  d.  DVT prophylaxis
Chronic cholecystitis s/p lap fenestrating subtotal cholecystectomy    a.  Tolerating diet  b.  Decrease in 24hr output to 75. continue to observe  c.  Possible  ERCP for persistent bile leak
Advanced care planning was discussed with patient and family.  Advanced care planning forms were reviewed and discussed.  Risks, benefits and alternatives of gastroenterologic procedures were discussed in detail and all questions were answered.    30 minutes spent.
I saw and examined the patient and agree with the above note.    s/p lap subtotal cholecystectomy  -ercp and biliary stent placement yesterday  -complaining of RUQ pain - may have an element of post-ercp pancreatitis/inflamation.   -Drain with serosang output  -ambulation encouraged    Orion Santillan MD (Cell: [phone])  Acute and Critical Care Surgery    The Acute Care Surgery (B Team) Attending Group Practice:  Dr. Gómez Lindsey, Dr. Nabeel Rincon, Dr. Teresa Mcgraw, Dr. Orion Santillan    Urgent issues - spectra [phone] or [phone]  Nonurgent issues - [phone]  Patient appointments or after hours - [phone] .
Advanced care planning was discussed with patient and family.  Advanced care planning forms were reviewed and discussed.  Risks, benefits and alternatives of gastroenterologic procedures were discussed in detail and all questions were answered.    30 minutes spent.

## 2019-02-20 NOTE — PROGRESS NOTE ADULT - SUBJECTIVE AND OBJECTIVE BOX
Surgery Progress Note    S:     Patient seen and examined. No acute events overnight. S/p ERCP with common bile duct stent placement. Feeling well this morning, pain controlled, tolerating diet, ambulating without difficulty.     O:    Vital Signs Last 24 Hrs  T(C): 37.1 (19 Feb 2019 22:53), Max: 37.2 (19 Feb 2019 17:52)  T(F): 98.8 (19 Feb 2019 22:53), Max: 98.9 (19 Feb 2019 17:52)  HR: 60 (19 Feb 2019 22:53) (60 - 78)  BP: 126/76 (19 Feb 2019 22:53) (118/72 - 144/81)  BP(mean): --  RR: 17 (19 Feb 2019 22:53) (17 - 18)  SpO2: 95% (19 Feb 2019 22:53) (95% - 100%)    PHYSICAL EXAM:   General: NAD, Lying in bed comfortably, alert, oriented x3  Pulm: Non-labored breathing on RA  GI/Abd: Softly distended, NT, no rebound/guarding, drain in place and collecting bile-tinged ss fluid    I&O's Detail    18 Feb 2019 07:01  -  19 Feb 2019 07:00  --------------------------------------------------------  IN:    dextrose 5% + sodium chloride 0.45%: 600 mL    Oral Fluid: 120 mL  Total IN: 720 mL    OUT:    Bulb: 20 mL    Voided: 1825 mL  Total OUT: 1845 mL    Total NET: -1125 mL      19 Feb 2019 07:01  -  20 Feb 2019 00:39  --------------------------------------------------------  IN:    dextrose 5% + sodium chloride 0.45%: 800 mL    Oral Fluid: 360 mL  Total IN: 1160 mL    OUT:    Bulb: 32 mL    Voided: 1050 mL  Total OUT: 1082 mL    Total NET: 78 mL          MEDICATIONS  (STANDING):  acetaminophen   Tablet .. 650 milliGRAM(s) Oral every 6 hours  dextrose 5% + sodium chloride 0.45% 1000 milliLiter(s) (100 mL/Hr) IV Continuous <Continuous>  enoxaparin Injectable 40 milliGRAM(s) SubCutaneous every 24 hours  pantoprazole    Tablet 40 milliGRAM(s) Oral before breakfast  rosuvastatin 40 milliGRAM(s) Oral at bedtime    MEDICATIONS  (PRN):  oxyCODONE    IR 5 milliGRAM(s) Oral every 4 hours PRN Moderate Pain (4 - 6)  oxyCODONE    IR 10 milliGRAM(s) Oral every 6 hours PRN Severe Pain (7 - 10)      Labs:                          12.4   6.08  )-----------( 206      ( 19 Feb 2019 07:00 )             38.5       02-19    138  |  97<L>  |  9   ----------------------------<  110<H>  4.2   |  30  |  0.84    Ca    9.4      19 Feb 2019 07:00  Phos  3.3     02-19  Mg     1.9     02-19        Radiology: Surgery Progress Note    S:     Patient seen and examined. No acute events overnight. S/p ERCP with common bile duct stent placement. Feeling well this morning, continues to have pain around the drain site, well controlled with pain regimen, tolerating diet, ambulating without difficulty.     O:    Vital Signs Last 24 Hrs  T(C): 37.1 (19 Feb 2019 22:53), Max: 37.2 (19 Feb 2019 17:52)  T(F): 98.8 (19 Feb 2019 22:53), Max: 98.9 (19 Feb 2019 17:52)  HR: 60 (19 Feb 2019 22:53) (60 - 78)  BP: 126/76 (19 Feb 2019 22:53) (118/72 - 144/81)  BP(mean): --  RR: 17 (19 Feb 2019 22:53) (17 - 18)  SpO2: 95% (19 Feb 2019 22:53) (95% - 100%)    PHYSICAL EXAM:   General: NAD, Lying in bed comfortably, alert, oriented x3  Pulm: Non-labored breathing on RA  GI/Abd: Softly distended, mild tenderness around drain site and in epigastrium, no rebound/guarding, drain in place and collecting bile-tinged ss fluid    I&O's Detail    18 Feb 2019 07:01  -  19 Feb 2019 07:00  --------------------------------------------------------  IN:    dextrose 5% + sodium chloride 0.45%: 600 mL    Oral Fluid: 120 mL  Total IN: 720 mL    OUT:    Bulb: 20 mL    Voided: 1825 mL  Total OUT: 1845 mL    Total NET: -1125 mL      19 Feb 2019 07:01  -  20 Feb 2019 00:39  --------------------------------------------------------  IN:    dextrose 5% + sodium chloride 0.45%: 800 mL    Oral Fluid: 360 mL  Total IN: 1160 mL    OUT:    Bulb: 32 mL    Voided: 1050 mL  Total OUT: 1082 mL    Total NET: 78 mL          MEDICATIONS  (STANDING):  acetaminophen   Tablet .. 650 milliGRAM(s) Oral every 6 hours  dextrose 5% + sodium chloride 0.45% 1000 milliLiter(s) (100 mL/Hr) IV Continuous <Continuous>  enoxaparin Injectable 40 milliGRAM(s) SubCutaneous every 24 hours  pantoprazole    Tablet 40 milliGRAM(s) Oral before breakfast  rosuvastatin 40 milliGRAM(s) Oral at bedtime    MEDICATIONS  (PRN):  oxyCODONE    IR 5 milliGRAM(s) Oral every 4 hours PRN Moderate Pain (4 - 6)  oxyCODONE    IR 10 milliGRAM(s) Oral every 6 hours PRN Severe Pain (7 - 10)      Labs:                          12.4   6.08  )-----------( 206      ( 19 Feb 2019 07:00 )             38.5       02-19    138  |  97<L>  |  9   ----------------------------<  110<H>  4.2   |  30  |  0.84    Ca    9.4      19 Feb 2019 07:00  Phos  3.3     02-19  Mg     1.9     02-19        Radiology:

## 2019-02-20 NOTE — PROGRESS NOTE ADULT - ASSESSMENT
Assessment/Plan:  73M s/p lap subtotal fenestrated cholecystectomy, POD#4, s/p ERCP with common bile duct stent placement, stable and tolerating a regular diet.    - f/u GI - final recs  - RD  - Pain management prn  - Monitor I/Os  - Monitor GI function  - encourage OOB and ambulation  - Dispo planning today    B Team Surgery  [id number]. Assessment/Plan:  73M s/p lap subtotal fenestrated cholecystectomy, POD#5, s/p ERCP with common bile duct stent placement, stable and tolerating a regular diet.    - f/u GI - final recs  - RD  - Pain management prn  - Monitor I/Os  - Monitor GI function  - encourage OOB and ambulation  - Dispo planning today    B Team Surgery  [id number].

## 2019-02-21 VITALS
HEART RATE: 69 BPM | TEMPERATURE: 98 F | DIASTOLIC BLOOD PRESSURE: 74 MMHG | SYSTOLIC BLOOD PRESSURE: 124 MMHG | OXYGEN SATURATION: 95 % | RESPIRATION RATE: 18 BRPM

## 2019-02-21 RX ADMIN — Medication 650 MILLIGRAM(S): at 05:53

## 2019-02-21 RX ADMIN — PANTOPRAZOLE SODIUM 40 MILLIGRAM(S): 20 TABLET, DELAYED RELEASE ORAL at 05:53

## 2019-02-21 RX ADMIN — ENOXAPARIN SODIUM 40 MILLIGRAM(S): 100 INJECTION SUBCUTANEOUS at 05:53

## 2019-02-21 RX ADMIN — Medication 650 MILLIGRAM(S): at 02:00

## 2019-02-21 RX ADMIN — Medication 650 MILLIGRAM(S): at 01:26

## 2019-02-21 RX ADMIN — Medication 650 MILLIGRAM(S): at 06:27

## 2019-02-21 NOTE — PROGRESS NOTE ADULT - ASSESSMENT
Assessment/Plan:  73M s/p lap subtotal fenestrated cholecystectomy, POD#5, s/p ERCP with common bile duct stent placement, stable and tolerating a regular diet.    - Cont regular diet  - Pain management prn  - Monitor I/Os  - Monitor GI function  - encourage OOB and ambulation  - Dispo planning today    B Team Surgery  [id number].

## 2019-02-21 NOTE — PROGRESS NOTE ADULT - SUBJECTIVE AND OBJECTIVE BOX
B Team Surgery Progress Note    Interval: Patient was having epigastric pain earlier in the morning yesterday and preferred to stay overnight. No acute overnight events.    SUBJECTIVE: Patient seen and examined at the bedside. Feeling well this morning and ready to go home. Tolerating regular diet without worsening pain and without nausea or vomiting. Pain in the epigastrium has resolved, continues to have mild pain around the drain. Has passed flatus and a bowel movement. Ambulating well.     VITALS  T(C): 36.8 (02-21-19 @ 06:08), Max: 37.8 (02-21-19 @ 01:50)  HR: 72 (02-21-19 @ 06:08) (64 - 74)  BP: 147/95 (02-21-19 @ 06:08) (120/74 - 150/87)  RR: 18 (02-21-19 @ 06:08) (17 - 20)  SpO2: 100% (02-21-19 @ 06:08) (97% - 100%)    Is/Os    02-20 @ 07:01  -  02-21 @ 07:00  --------------------------------------------------------  IN:    Oral Fluid: 720 mL  Total IN: 720 mL    OUT:    Bulb: 32.5 mL    Voided: 1550 mL  Total OUT: 1582.5 mL    Total NET: -862.5 mL    PHYSICAL EXAM:   General: NAD, Lying in bed comfortably, alert, oriented x3  Pulm: Non-labored breathing  GI/Abd: Softly distended, mild tenderness of the skin around the drain site, no epigastric tenderness, no rebound/guarding, drain in place and collecting bile-tinged ss fluid    MEDICATIONS (STANDING): acetaminophen   Tablet .. 650 milliGRAM(s) Oral every 6 hours  enoxaparin Injectable 40 milliGRAM(s) SubCutaneous every 24 hours  pantoprazole    Tablet 40 milliGRAM(s) Oral before breakfast  rosuvastatin 40 milliGRAM(s) Oral at bedtime    MEDICATIONS (PRN):docusate sodium 100 milliGRAM(s) Oral daily PRN Constipation  HYDROmorphone  Injectable 0.5 milliGRAM(s) IV Push every 4 hours PRN Breakthrough Pain  oxyCODONE    IR 5 milliGRAM(s) Oral every 4 hours PRN Moderate Pain (4 - 6)  oxyCODONE    IR 10 milliGRAM(s) Oral every 6 hours PRN Severe Pain (7 - 10)  senna 1 Tablet(s) Oral two times a day PRN Constipation    LABS  CBC (02-20 @ 06:20)                              12.6<L>                         6.72    )----------------(  220        --    % Neutrophils, --    % Lymphocytes, ANC: --                                  37.9<L>    BMP (02-20 @ 06:20)             138     |  99      |  6<L>  		Ca++ --      Ca 9.3                ---------------------------------( 99    		Mg 1.7                4.2     |  28      |  0.83  			Ph 3.5       LFTs (02-20 @ 06:20)      TPro 6.3 / Alb 3.4 / TBili 0.6 / DBili -- / AST 36 / ALT 26 / AlkPhos 61            IMAGING STUDIES

## 2019-02-22 ENCOUNTER — EMERGENCY (EMERGENCY)
Facility: HOSPITAL | Age: 74
LOS: 1 days | Discharge: HOME | End: 2019-02-22
Attending: EMERGENCY MEDICINE | Admitting: EMERGENCY MEDICINE
Payer: MEDICARE

## 2019-02-22 VITALS
DIASTOLIC BLOOD PRESSURE: 78 MMHG | OXYGEN SATURATION: 100 % | HEART RATE: 115 BPM | TEMPERATURE: 103 F | SYSTOLIC BLOOD PRESSURE: 129 MMHG | RESPIRATION RATE: 16 BRPM

## 2019-02-22 PROCEDURE — 99053 MED SERV 10PM-8AM 24 HR FAC: CPT

## 2019-02-22 PROCEDURE — 99284 EMERGENCY DEPT VISIT MOD MDM: CPT | Mod: GC,25

## 2019-02-22 NOTE — ED ADULT TRIAGE NOTE - CHIEF COMPLAINT QUOTE
Pt w/ hx of partial gallbladder removal at [facility] s/p drain placed at endoscopy 2/19 discharged 2/21 1100 to have drain removed in 8 weeks Pt c/o fevers and chills today Is febrile and tachy in triage sepsis sheet applied to chart.

## 2019-02-23 VITALS
SYSTOLIC BLOOD PRESSURE: 106 MMHG | DIASTOLIC BLOOD PRESSURE: 54 MMHG | TEMPERATURE: 98 F | HEART RATE: 72 BPM | RESPIRATION RATE: 16 BRPM | OXYGEN SATURATION: 98 %

## 2019-02-23 LAB
ALBUMIN SERPL ELPH-MCNC: 3.6 G/DL — SIGNIFICANT CHANGE UP (ref 3.3–5)
ALP SERPL-CCNC: 455 U/L — HIGH (ref 40–120)
ALT FLD-CCNC: 325 U/L — HIGH (ref 4–41)
ANION GAP SERPL CALC-SCNC: 13 MMO/L — SIGNIFICANT CHANGE UP (ref 7–14)
APPEARANCE UR: CLEAR — SIGNIFICANT CHANGE UP
AST SERPL-CCNC: 193 U/L — HIGH (ref 4–40)
B PERT DNA SPEC QL NAA+PROBE: NOT DETECTED — SIGNIFICANT CHANGE UP
BACTERIA # UR AUTO: NEGATIVE — SIGNIFICANT CHANGE UP
BASE EXCESS BLDV CALC-SCNC: 7.7 MMOL/L — SIGNIFICANT CHANGE UP
BASOPHILS # BLD AUTO: 0.02 K/UL — SIGNIFICANT CHANGE UP (ref 0–0.2)
BASOPHILS NFR BLD AUTO: 0.2 % — SIGNIFICANT CHANGE UP (ref 0–2)
BILIRUB SERPL-MCNC: 0.8 MG/DL — SIGNIFICANT CHANGE UP (ref 0.2–1.2)
BILIRUB UR-MCNC: NEGATIVE — SIGNIFICANT CHANGE UP
BLOOD GAS VENOUS - CREATININE: 0.73 MG/DL — SIGNIFICANT CHANGE UP (ref 0.5–1.3)
BLOOD UR QL VISUAL: NEGATIVE — SIGNIFICANT CHANGE UP
BUN SERPL-MCNC: 7 MG/DL — SIGNIFICANT CHANGE UP (ref 7–23)
C PNEUM DNA SPEC QL NAA+PROBE: NOT DETECTED — SIGNIFICANT CHANGE UP
CALCIUM SERPL-MCNC: 9.3 MG/DL — SIGNIFICANT CHANGE UP (ref 8.4–10.5)
CHLORIDE BLDV-SCNC: 96 MMOL/L — SIGNIFICANT CHANGE UP (ref 96–108)
CHLORIDE SERPL-SCNC: 95 MMOL/L — LOW (ref 98–107)
CO2 SERPL-SCNC: 27 MMOL/L — SIGNIFICANT CHANGE UP (ref 22–31)
COLOR SPEC: YELLOW — SIGNIFICANT CHANGE UP
CREAT SERPL-MCNC: 0.78 MG/DL — SIGNIFICANT CHANGE UP (ref 0.5–1.3)
EOSINOPHIL # BLD AUTO: 0.16 K/UL — SIGNIFICANT CHANGE UP (ref 0–0.5)
EOSINOPHIL NFR BLD AUTO: 2 % — SIGNIFICANT CHANGE UP (ref 0–6)
FLUAV H1 2009 PAND RNA SPEC QL NAA+PROBE: NOT DETECTED — SIGNIFICANT CHANGE UP
FLUAV H1 RNA SPEC QL NAA+PROBE: NOT DETECTED — SIGNIFICANT CHANGE UP
FLUAV H3 RNA SPEC QL NAA+PROBE: NOT DETECTED — SIGNIFICANT CHANGE UP
FLUAV SUBTYP SPEC NAA+PROBE: NOT DETECTED — SIGNIFICANT CHANGE UP
FLUBV RNA SPEC QL NAA+PROBE: NOT DETECTED — SIGNIFICANT CHANGE UP
GAS PNL BLDV: 132 MMOL/L — LOW (ref 136–146)
GLUCOSE BLDV-MCNC: 137 — HIGH (ref 70–99)
GLUCOSE SERPL-MCNC: 133 MG/DL — HIGH (ref 70–99)
GLUCOSE UR-MCNC: NEGATIVE — SIGNIFICANT CHANGE UP
HADV DNA SPEC QL NAA+PROBE: NOT DETECTED — SIGNIFICANT CHANGE UP
HCO3 BLDV-SCNC: 30 MMOL/L — HIGH (ref 20–27)
HCOV PNL SPEC NAA+PROBE: SIGNIFICANT CHANGE UP
HCT VFR BLD CALC: 32.1 % — LOW (ref 39–50)
HCT VFR BLDV CALC: 33.3 % — LOW (ref 39–51)
HGB BLD-MCNC: 10.6 G/DL — LOW (ref 13–17)
HGB BLDV-MCNC: 10.8 G/DL — LOW (ref 13–17)
HMPV RNA SPEC QL NAA+PROBE: NOT DETECTED — SIGNIFICANT CHANGE UP
HPIV1 RNA SPEC QL NAA+PROBE: NOT DETECTED — SIGNIFICANT CHANGE UP
HPIV2 RNA SPEC QL NAA+PROBE: NOT DETECTED — SIGNIFICANT CHANGE UP
HPIV3 RNA SPEC QL NAA+PROBE: NOT DETECTED — SIGNIFICANT CHANGE UP
HPIV4 RNA SPEC QL NAA+PROBE: NOT DETECTED — SIGNIFICANT CHANGE UP
HYALINE CASTS # UR AUTO: NEGATIVE — SIGNIFICANT CHANGE UP
IMM GRANULOCYTES NFR BLD AUTO: 0.4 % — SIGNIFICANT CHANGE UP (ref 0–1.5)
KETONES UR-MCNC: NEGATIVE — SIGNIFICANT CHANGE UP
LACTATE BLDV-MCNC: 1.4 MMOL/L — SIGNIFICANT CHANGE UP (ref 0.5–2)
LEUKOCYTE ESTERASE UR-ACNC: SIGNIFICANT CHANGE UP
LYMPHOCYTES # BLD AUTO: 0.67 K/UL — LOW (ref 1–3.3)
LYMPHOCYTES # BLD AUTO: 8.2 % — LOW (ref 13–44)
MCHC RBC-ENTMCNC: 29.6 PG — SIGNIFICANT CHANGE UP (ref 27–34)
MCHC RBC-ENTMCNC: 33 % — SIGNIFICANT CHANGE UP (ref 32–36)
MCV RBC AUTO: 89.7 FL — SIGNIFICANT CHANGE UP (ref 80–100)
MONOCYTES # BLD AUTO: 0.59 K/UL — SIGNIFICANT CHANGE UP (ref 0–0.9)
MONOCYTES NFR BLD AUTO: 7.2 % — SIGNIFICANT CHANGE UP (ref 2–14)
NEUTROPHILS # BLD AUTO: 6.69 K/UL — SIGNIFICANT CHANGE UP (ref 1.8–7.4)
NEUTROPHILS NFR BLD AUTO: 82 % — HIGH (ref 43–77)
NITRITE UR-MCNC: NEGATIVE — SIGNIFICANT CHANGE UP
NRBC # FLD: 0 K/UL — LOW (ref 25–125)
PCO2 BLDV: 47 MMHG — SIGNIFICANT CHANGE UP (ref 41–51)
PH BLDV: 7.45 PH — HIGH (ref 7.32–7.43)
PH UR: 7 — SIGNIFICANT CHANGE UP (ref 5–8)
PLATELET # BLD AUTO: 231 K/UL — SIGNIFICANT CHANGE UP (ref 150–400)
PMV BLD: 9 FL — SIGNIFICANT CHANGE UP (ref 7–13)
PO2 BLDV: 30 MMHG — LOW (ref 35–40)
POTASSIUM BLDV-SCNC: 3.6 MMOL/L — SIGNIFICANT CHANGE UP (ref 3.4–4.5)
POTASSIUM SERPL-MCNC: 3.9 MMOL/L — SIGNIFICANT CHANGE UP (ref 3.5–5.3)
POTASSIUM SERPL-SCNC: 3.9 MMOL/L — SIGNIFICANT CHANGE UP (ref 3.5–5.3)
PROT SERPL-MCNC: 6.8 G/DL — SIGNIFICANT CHANGE UP (ref 6–8.3)
PROT UR-MCNC: 10 — SIGNIFICANT CHANGE UP
RBC # BLD: 3.58 M/UL — LOW (ref 4.2–5.8)
RBC # FLD: 13.2 % — SIGNIFICANT CHANGE UP (ref 10.3–14.5)
RBC CASTS # UR COMP ASSIST: SIGNIFICANT CHANGE UP (ref 0–?)
RSV RNA SPEC QL NAA+PROBE: NOT DETECTED — SIGNIFICANT CHANGE UP
RV+EV RNA SPEC QL NAA+PROBE: DETECTED — HIGH
SAO2 % BLDV: 56.7 % — LOW (ref 60–85)
SODIUM SERPL-SCNC: 135 MMOL/L — SIGNIFICANT CHANGE UP (ref 135–145)
SP GR SPEC: 1.01 — SIGNIFICANT CHANGE UP (ref 1–1.04)
SQUAMOUS # UR AUTO: SIGNIFICANT CHANGE UP
UROBILINOGEN FLD QL: NORMAL — SIGNIFICANT CHANGE UP
WBC # BLD: 8.16 K/UL — SIGNIFICANT CHANGE UP (ref 3.8–10.5)
WBC # FLD AUTO: 8.16 K/UL — SIGNIFICANT CHANGE UP (ref 3.8–10.5)
WBC UR QL: HIGH (ref 0–?)

## 2019-02-23 PROCEDURE — 71046 X-RAY EXAM CHEST 2 VIEWS: CPT | Mod: 26

## 2019-02-23 PROCEDURE — 71260 CT THORAX DX C+: CPT | Mod: 26

## 2019-02-23 PROCEDURE — 74177 CT ABD & PELVIS W/CONTRAST: CPT | Mod: 26

## 2019-02-23 PROCEDURE — 76705 ECHO EXAM OF ABDOMEN: CPT | Mod: 26

## 2019-02-23 RX ORDER — PIPERACILLIN AND TAZOBACTAM 4; .5 G/20ML; G/20ML
3.38 INJECTION, POWDER, LYOPHILIZED, FOR SOLUTION INTRAVENOUS ONCE
Qty: 0 | Refills: 0 | Status: DISCONTINUED | OUTPATIENT
Start: 2019-02-23 | End: 2019-02-23

## 2019-02-23 RX ORDER — ACETAMINOPHEN 500 MG
975 TABLET ORAL ONCE
Qty: 0 | Refills: 0 | Status: COMPLETED | OUTPATIENT
Start: 2019-02-23 | End: 2019-02-23

## 2019-02-23 RX ORDER — SODIUM CHLORIDE 9 MG/ML
1000 INJECTION INTRAMUSCULAR; INTRAVENOUS; SUBCUTANEOUS ONCE
Qty: 0 | Refills: 0 | Status: COMPLETED | OUTPATIENT
Start: 2019-02-23 | End: 2019-02-23

## 2019-02-23 RX ADMIN — Medication 975 MILLIGRAM(S): at 02:01

## 2019-02-23 RX ADMIN — Medication 975 MILLIGRAM(S): at 01:37

## 2019-02-23 RX ADMIN — SODIUM CHLORIDE 1000 MILLILITER(S): 9 INJECTION INTRAMUSCULAR; INTRAVENOUS; SUBCUTANEOUS at 01:37

## 2019-02-23 NOTE — ED ADULT NURSE NOTE - OBJECTIVE STATEMENT
Received pt in room 14, ambulatory, pt A&Ox4, respirations even and unlabored b/l. Abdomen soft, nondistended, nontender. YOUSUF drain noted on right abd region with dressing dry and intact. IVL 20g Angiocath placed on left AC. Labs sent. MD Miller at bedside. Wife and daughter at bedside. Will continue to monitor.

## 2019-02-23 NOTE — CONSULT NOTE ADULT - SUBJECTIVE AND OBJECTIVE BOX
General Surgery Consult Note  Pager [phone]    HPI:  73-year-old man s/p interval subtotal fenestrating cholecystectomy, ERCP with CBD stent for bile leak returns 2 days after discharge with high-grade fevers. Was feeling chills at home and low appetite. Abdominal pain remains the same at the operative drain site. Denies nausea/vomiting. Denies diarrhea but noted some blood in his last BM. Passing flatus. Noticed a new cough with white sputum. Denies dysuria/frequency.      PAST MEDICAL & SURGICAL HISTORY:  Chronic cholecystitis  Essential hypertension  High cholesterol  No significant past surgical history      ALLERGIES:  Cipro (Hives)  morphine (Hives)  peanuts (Hives)      HOME MEDICATIONS:  acetaminophen 325 mg oral tablet: 2 tab(s) orally every 6 hours, As Needed (2019 15:15)  rosuvastatin 20 mg oral tablet: 1 tab(s) orally once a day (15 Feb 2019 12:02)      SOCIAL HISTORY:  Denies smoking and ETOH use. Lives with family.      FAMILY HISTORY:  No pertinent history in first degree relatives.  ___________________________________________  REVIEW OF SYSTEMS:  Constitutional: Fevers, chills  ENMT: No changes in hearing, no changes in vision, no sore throat, no cough  Respiratory: No shortness of breath  Cardiovascular: No chest pain, palpitations  Gastrointestinal: No abdominal pain, no diarrhea/constipation  Genitourinary: No dysuria, frequency, or urgency    Extremities: No joint swelling, no limited range of movement  Neurological: No paresthesia  Skin: No rashes  ___________________________________________  PHYSICAL EXAM:  Vital Signs Last 24 Hrs  T(C): 36.9 (2019 05:23), Max: 39.3 (2019 23:49)  T(F): 98.5 (2019 05:23), Max: 102.8 (2019 23:49)  HR: 72 (2019 05:23) (72 - 115)  BP: 106/54 (2019 05:23) (106/54 - 129/78)  BP(mean): --  RR: 16 (2019 05:23) (14 - 16)  SpO2: 98% (2019 05:23) (98% - 100%)CAPILLARY BLOOD GLUCOSE      General: A&Ox3, NAD.  Neuro: Motor and sensory grossly intact with no focal deficits.  HEENT: Anicteric sclerae.  Respiratory: Unlabored breathing.   CVS: Regular rate and rhythm.  Abdomen: Soft, non-distended, pain around operative drain site. Drain with serous drainage.   Extremities: Warm bilaterally w/ palpable pulses.   MSK: Intact ROM.  ____________________________________________  LABS:  CBC Full  -  ( 2019 01:20 )  WBC Count : 8.16 K/uL  Hemoglobin : 10.6 g/dL  Hematocrit : 32.1 %  Platelet Count - Automated : 231 K/uL  Mean Cell Volume : 89.7 fL  Mean Cell Hemoglobin : 29.6 pg  Mean Cell Hemoglobin Concentration : 33.0 %  Auto Neutrophil # : 6.69 K/uL  Auto Lymphocyte # : 0.67 K/uL  Auto Monocyte # : 0.59 K/uL  Auto Eosinophil # : 0.16 K/uL  Auto Basophil # : 0.02 K/uL  Auto Neutrophil % : 82.0 %  Auto Lymphocyte % : 8.2 %  Auto Monocyte % : 7.2 %  Auto Eosinophil % : 2.0 %  Auto Basophil % : 0.2 %        135  |  95<L>  |  7   ----------------------------<  133<H>  3.9   |  27  |  0.78    Ca    9.3      2019 01:20    TPro  6.8  /  Alb  3.6  /  TBili  0.8  /  DBili  x   /  AST  193<H>  /  ALT  325<H>  /  AlkPhos  455<H>      LIVER FUNCTIONS - ( 2019 01:20 )  Alb: 3.6 g/dL / Pro: 6.8 g/dL / ALK PHOS: 455 u/L / ALT: 325 u/L / AST: 193 u/L / GGT: x             Urinalysis Basic - ( 2019 01:40 )    Color: YELLOW / Appearance: CLEAR / S.012 / pH: 7.0  Gluc: NEGATIVE / Ketone: NEGATIVE  / Bili: NEGATIVE / Urobili: NORMAL   Blood: NEGATIVE / Protein: 10 / Nitrite: NEGATIVE   Leuk Esterase: SMALL / RBC: 0-2 / WBC 11-25   Sq Epi: OCC / Non Sq Epi: x / Bacteria: NEGATIVE    ____________________________________________  RADIOLOGY:  CT Chest w/ IV Cont (19 @ 05:15)   CHEST:     LUNGS AND LARGE AIRWAYS: Airways are unremarkable.  Bibasilar   subsegmental atelectasis.  No focal lung consolidation or suspicious   pulmonary nodule..  PLEURA: No pleural effusion. No pneumothorax.  VESSELS: Atheromatous disease.  HEART: Normal heart size.  No pericardial effusion.  Atherosclerotic   calcifications in the coronary arteries..  MEDIASTINUM AND REGINO: No lymphadenopathy.  Trace secretions/debris in the   upper esophagus.    CHEST WALL AND LOWER NECK: Within normal limits.    ABDOMEN AND PELVIS:    LIVER: Within normal limits.  BILE DUCTS: CBD stent in place.  Pneumobilia indicating stent patency.  GALLBLADDER: Postsurgical changes from subtotal fenestrating   cholecystectomy.  Residual gallbladder is decompressed.    SPLEEN: Within normal limits.  PANCREAS: Within normal limits.  ADRENALS: Within normal limits.  KIDNEYS/URETERS: Approximately 3.3 cm left upper pole renal cyst.    BLADDER: Within normal limits.  REPRODUCTIVE ORGANS: Heterogeneous prostate measures approximately 3.6 x   5.2 x 4.4 cm    BOWEL: No bowel obstruction.  Normal appendix.  PERITONEUM: Percutaneous surgical drain in the right upper quadrant   courses along the undersurface of the liver and terminates in the   hepatorenal fossa.  No ascites, loculated fluid collection or free air..  VESSELS:  Scattered atherosclerotic calcifications of the aortoiliac tree.  RETROPERITONEUM: No retroperitoneal hemorrhage.  No lymphadenopathy.    ABDOMINAL WALL: Nonspecificpostsurgical changes in the periumbilical   region.  Mild subcutaneous edema in the right flank.  Small   fat-containing left internal hernia.  BONES: Degenerative changes in the thoracic and lumbar spine.    IMPRESSION:     Chest: No evidence of pneumonia.  Trace secretions/debris in the upper   esophagus.  No aspiration/aspiration pneumonia as questioned on the   requisition.    Abdomen/pelvis: Unremarkable postsurgical changes following subtotal   fenestrating cholecystectomy.  No fluid collection or abscess.        US Abdomen Limited (19 @ 06:38)   Liver: Within normal limits.    Bile ducts: The common hepatic duct is not visualized.    Gallbladder: The patient is status post a partial cholecystectomy as per   history. The gallbladder remnant is not visualized.    Pancreas: Visualized portions are within normal limits.    Right kidney: 11.6 cm. No hydronephrosis.    Ascites: None.    IVC: Visualized portions are within normal limits.    IMPRESSION:     Nonvisualization of the gallbladder remnant.

## 2019-02-23 NOTE — ED ADULT NURSE NOTE - NSIMPLEMENTINTERV_GEN_ALL_ED
Implemented All Universal Safety Interventions:  [place] to call system. Call bell, personal items and telephone within reach. Instruct patient to call for assistance. Room bathroom lighting operational. Non-slip footwear when patient is off stretcher. Physically safe environment: no spills, clutter or unnecessary equipment. Stretcher in lowest position, wheels locked, appropriate side rails in place.

## 2019-02-23 NOTE — ED ADULT NURSE NOTE - CHIEF COMPLAINT
The patient is a 73y Male complaining of fever, chills, body aches since today. Pt s/p partial cholecystectomy and was discharged yesterday. Pt with YOUSUF drain at site.

## 2019-02-23 NOTE — ED PROVIDER NOTE - PROGRESS NOTE DETAILS
Ubaldo KABA MD PGY1: Patient offered CDU to trend LFTS and obserbvation. Patient prefers to go home and will return for any concerns.

## 2019-02-23 NOTE — ED ADULT NURSE NOTE - CHIEF COMPLAINT QUOTE
Pt w/ hx of partial gallbladder removal at [facility] s/p drain placed at endoscopy 2/19 discharged 2/21 1100 to have drain removed in 8 weeks Pt c/o fevers and chills today Is febrile and tachy in triage sepsis sheet applied to chart.

## 2019-02-23 NOTE — ED PROVIDER NOTE - CLINICAL SUMMARY MEDICAL DECISION MAKING FREE TEXT BOX
73M recent surgery, discharged yest., returns with chills, cough and diarrhea. CXR, Surgery eval, likely CT imaging.

## 2019-02-23 NOTE — ED PROVIDER NOTE - NSFOLLOWUPCLINICS_GEN_ALL_ED_FT
[facility] - Primary Care  Primary Care  [address] [place] [address]  Phone: [phone]  Fax:   Follow Up Time:

## 2019-02-23 NOTE — ED PROVIDER NOTE - OBJECTIVE STATEMENT
73M s/p recent surgery, dx cholecystitis 12/2018, "infected" and treated with cholecystostomy drainage, then returned last wk. for cholecystectomy but had staged procedure and left yest with YOUSUF drain in place. Pt. with 5-6/10 abd pain at time of dc and unchanged now, no narcotic pain meds PTA. States has had dec. po intake but alejandro. liquids, today had chills at 3pm, took tylenol with relief, then worse shaking chills at 10:30 pm and returned to ED. No assoc. N/V, + 3 episodes of loose stool, 2 with blood, +cough at home with white sputum.

## 2019-02-23 NOTE — CONSULT NOTE ADULT - ASSESSMENT
Assessment/Plan: 73y Male s/p subtotal fenestrating cholecystectomy with CBD stent for bile leak now presents with high-grade fevers 2 days after discharge.   Abdominal pain around drain site unchanged. No nausea/vomiting. Assessment/Plan: 73y Male s/p subtotal fenestrating cholecystectomy with CBD stent for bile leak now presents with high-grade fevers 2 days after discharge.   Abdominal pain around drain site unchanged. No nausea/vomiting.   Positive for Rhinovirus.     - Supportive care  - No further surgical interventions  - Follow up with Dr. Santillan per his discharge instructions from last hospitalization    Seen with Dr. Ariza  Pager [phone]

## 2019-02-24 LAB
SPECIMEN SOURCE: SIGNIFICANT CHANGE UP

## 2019-02-25 LAB
-  AMPICILLIN: SIGNIFICANT CHANGE UP
-  CIPROFLOXACIN: SIGNIFICANT CHANGE UP
-  NITROFURANTOIN: SIGNIFICANT CHANGE UP
-  TETRACYCLINE: SIGNIFICANT CHANGE UP
-  VANCOMYCIN: SIGNIFICANT CHANGE UP
BACTERIA UR CULT: SIGNIFICANT CHANGE UP
METHOD TYPE: SIGNIFICANT CHANGE UP
ORGANISM # SPEC MICROSCOPIC CNT: SIGNIFICANT CHANGE UP
ORGANISM # SPEC MICROSCOPIC CNT: SIGNIFICANT CHANGE UP

## 2019-02-26 RX ORDER — NITROFURANTOIN MACROCRYSTAL 50 MG
1 CAPSULE ORAL
Qty: 14 | Refills: 0 | OUTPATIENT
Start: 2019-02-26 | End: 2019-03-04

## 2019-02-26 NOTE — ED POST DISCHARGE NOTE - RESULT SUMMARY
UCX : Enterococcus faecalis > 100,000 No antibiotic listed in ED provider note or prescription writer at time of discharge. Patient contact # [phone] S/W patient and daughter discussed with them will erx Macrobid 100mg BID X 7 days. Patient to follow up with PMD tomorrow and will have repeat UA/UCX. Discussed with patient need to return to ED if symptoms don't continue to improve or recur or develops any new or worsening symptoms that are of concern.

## 2019-02-27 ENCOUNTER — APPOINTMENT (OUTPATIENT)
Dept: SURGERY | Facility: CLINIC | Age: 74
End: 2019-02-27
Payer: MEDICARE

## 2019-02-27 VITALS
WEIGHT: 170 LBS | BODY MASS INDEX: 23.8 KG/M2 | HEART RATE: 71 BPM | DIASTOLIC BLOOD PRESSURE: 77 MMHG | TEMPERATURE: 98.6 F | SYSTOLIC BLOOD PRESSURE: 124 MMHG | HEIGHT: 71 IN

## 2019-02-27 PROCEDURE — 99024 POSTOP FOLLOW-UP VISIT: CPT

## 2019-02-28 LAB
BACTERIA BLD CULT: SIGNIFICANT CHANGE UP
BACTERIA BLD CULT: SIGNIFICANT CHANGE UP

## 2019-03-01 NOTE — PLAN
[FreeTextEntry1] : Mr. Melendez underwent a laparoscopic subtotal cholecystectomy and has recovered well. The incisions are well healed and good PO nutrition is being tolerated. the pathology revealed no evidence of malignancy. \par \par Resume normal activity with gradual resumption of strenuous activity\par Avoid fatty foods. Low fat diet should diarrhea develop\par Follow up with me in clinic if yellowing of skin develops or fever/chills and RUQ pain develops. \par GI was consulted during inpatient and an ERCP/stent was placed, a follow up appointment should be scheduled with the respective gastroenterologist.\par \par I spent 15min reviewing data, images and information. Greater than 50% of my time was spent in face to face discussion regarding wound healing, postoperative diet and activity.\par \par Orion Santillan MD\par Acute Care Surgery\par

## 2019-03-01 NOTE — HISTORY OF PRESENT ILLNESS
[de-identified] : Mr. Melendez is a 74 y/o gentleman who underwent a percutaneous cholecystostomy tube placement for acute cholecystitis a few months back. After symptoms subsided, a cholangiogram was done which showed  patent cystic duct. I gave him the option of pulling the tube or undergoing a cholecystectomy. After discussing the risks and benefits he was scheduled for surgery. He underwent a fenestrating subtotal cholecystectomy due to ongoing inflammation. A drain was placed and he underwent an ERCP with stent placement.  has recovered well and denies fevers, chills, abdominal pain, SOB/dyspnea or weakness/fatigue. Tolerating adequate PO intake and GI activity (bowel movements) has recovered without constipation or diarrhea. No complaints regarding incisions and dressings. His drain is in place and he is doing well. \par

## 2019-03-01 NOTE — PHYSICAL EXAM
[de-identified] : Well ,comfortable. [de-identified] : Soft, nontender, nondistended. The incisions are healed well without signs of erythema, cellulitis or drainage. No palpable hernia formation.\par Drain with scan fluid, serous in quality

## 2019-07-28 ENCOUNTER — EMERGENCY (EMERGENCY)
Facility: HOSPITAL | Age: 74
LOS: 1 days | Discharge: HOME | End: 2019-07-28
Attending: EMERGENCY MEDICINE | Admitting: EMERGENCY MEDICINE
Payer: MEDICARE

## 2019-07-28 VITALS
TEMPERATURE: 98 F | DIASTOLIC BLOOD PRESSURE: 70 MMHG | OXYGEN SATURATION: 98 % | SYSTOLIC BLOOD PRESSURE: 143 MMHG | HEART RATE: 67 BPM | RESPIRATION RATE: 18 BRPM

## 2019-07-28 PROCEDURE — 99282 EMERGENCY DEPT VISIT SF MDM: CPT

## 2019-07-28 NOTE — ED PROVIDER NOTE - MUSCULOSKELETAL MINIMAL EXAM
Bilateral hand: motor and sensation intact, good ROM, no gross swelling, no bony tenderness/motor intact/normal range of motion/no muscle tenderness

## 2019-07-28 NOTE — ED ADULT TRIAGE NOTE - CHIEF COMPLAINT QUOTE
p/t c/o of pain and cramps to rt hand since this afternoon, denies any trauma, nad note, good ROM noted, no chest pain

## 2019-07-28 NOTE — ED PROVIDER NOTE - OBJECTIVE STATEMENT
PMH of D 73 year old male with PMH of HLD presents to the ED complaining of pain and cramping in bilateral hands that started this morning; states he was just sitting down, felt stiffness with associated tingling sensation in both hands; denies weakness, loss of sensation, slurred speech, headache, dizziness, blurry vision, fever, chills, or any other associated complaints. 73 year old male with PMH of HLD presents to the ED complaining of pain and cramping in bilateral hands that started this morning; states he was just sitting down, felt stiffness with associated tingling sensation in both hands; denies weakness, loss of sensation, slurred speech, headache, dizziness, blurry vision, fever, chills, or any other associated complaints.    Attendinyo male presents with bilateral hand cramping which occurred earlier today.  no weakness.  no vomiting.  no coordination issues.  symptoms were bilateral and resolved now.

## 2019-07-28 NOTE — ED PROVIDER NOTE - CLINICAL SUMMARY MEDICAL DECISION MAKING FREE TEXT BOX
73 year old male with pain and stiffness of bilateral hands for a day, likely arthralgia of bilateral hands; no motor or sensory deficits. Pt reassured, return precautions discussed, refer to Rheumatology, PMD follow up.

## 2019-07-28 NOTE — ED PROVIDER NOTE - NSFOLLOWUPINSTRUCTIONS_ED_ALL_ED_FT
Please follow up with your primary care doctor in 1-2 days.  See referral attached for rheumatology follow up, call and make an appointment.  If you have any new, worsened or concerning symptoms, please return to the emergency department immediately.

## 2020-02-19 NOTE — H&P PST ADULT - LIVES WITH, PROFILE
spouse
PROBLEM DIAGNOSES  Problem: Left knee pain  Assessment and Plan: left total knee replacement; preop instructions given, medical clearance needed

## 2021-05-04 ENCOUNTER — EMERGENCY (EMERGENCY)
Facility: HOSPITAL | Age: 76
LOS: 1 days | Discharge: HOME | End: 2021-05-04
Attending: EMERGENCY MEDICINE | Admitting: EMERGENCY MEDICINE
Payer: MEDICARE

## 2021-05-04 VITALS
RESPIRATION RATE: 24 BRPM | DIASTOLIC BLOOD PRESSURE: 102 MMHG | HEART RATE: 78 BPM | HEIGHT: 69 IN | OXYGEN SATURATION: 98 % | SYSTOLIC BLOOD PRESSURE: 175 MMHG | TEMPERATURE: 98 F

## 2021-05-04 VITALS
DIASTOLIC BLOOD PRESSURE: 69 MMHG | SYSTOLIC BLOOD PRESSURE: 120 MMHG | HEART RATE: 57 BPM | OXYGEN SATURATION: 99 % | RESPIRATION RATE: 15 BRPM

## 2021-05-04 LAB
ALBUMIN SERPL ELPH-MCNC: 4.6 G/DL — SIGNIFICANT CHANGE UP (ref 3.3–5)
ALP SERPL-CCNC: 97 U/L — SIGNIFICANT CHANGE UP (ref 40–120)
ALT FLD-CCNC: 17 U/L — SIGNIFICANT CHANGE UP (ref 4–41)
ANION GAP SERPL CALC-SCNC: 13 MMOL/L — SIGNIFICANT CHANGE UP (ref 7–14)
APTT BLD: 35.4 SEC — SIGNIFICANT CHANGE UP (ref 27–36.3)
AST SERPL-CCNC: 24 U/L — SIGNIFICANT CHANGE UP (ref 4–40)
BASOPHILS # BLD AUTO: 0.05 K/UL — SIGNIFICANT CHANGE UP (ref 0–0.2)
BASOPHILS NFR BLD AUTO: 0.8 % — SIGNIFICANT CHANGE UP (ref 0–2)
BILIRUB SERPL-MCNC: 0.5 MG/DL — SIGNIFICANT CHANGE UP (ref 0.2–1.2)
BLOOD GAS VENOUS COMPREHENSIVE RESULT: SIGNIFICANT CHANGE UP
BLOOD GAS VENOUS COMPREHENSIVE RESULT: SIGNIFICANT CHANGE UP
BUN SERPL-MCNC: 14 MG/DL — SIGNIFICANT CHANGE UP (ref 7–23)
CALCIUM SERPL-MCNC: 9.8 MG/DL — SIGNIFICANT CHANGE UP (ref 8.4–10.5)
CHLORIDE SERPL-SCNC: 99 MMOL/L — SIGNIFICANT CHANGE UP (ref 98–107)
CO2 SERPL-SCNC: 26 MMOL/L — SIGNIFICANT CHANGE UP (ref 22–31)
CREAT SERPL-MCNC: 0.9 MG/DL — SIGNIFICANT CHANGE UP (ref 0.5–1.3)
EOSINOPHIL # BLD AUTO: 0.72 K/UL — HIGH (ref 0–0.5)
EOSINOPHIL NFR BLD AUTO: 11.9 % — HIGH (ref 0–6)
GLUCOSE SERPL-MCNC: 103 MG/DL — HIGH (ref 70–99)
HCT VFR BLD CALC: 48.6 % — SIGNIFICANT CHANGE UP (ref 39–50)
HGB BLD-MCNC: 15.9 G/DL — SIGNIFICANT CHANGE UP (ref 13–17)
IANC: 2.47 K/UL — SIGNIFICANT CHANGE UP (ref 1.5–8.5)
IMM GRANULOCYTES NFR BLD AUTO: 0.2 % — SIGNIFICANT CHANGE UP (ref 0–1.5)
INR BLD: 0.98 RATIO — SIGNIFICANT CHANGE UP (ref 0.88–1.16)
LYMPHOCYTES # BLD AUTO: 2.31 K/UL — SIGNIFICANT CHANGE UP (ref 1–3.3)
LYMPHOCYTES # BLD AUTO: 38.1 % — SIGNIFICANT CHANGE UP (ref 13–44)
MCHC RBC-ENTMCNC: 30 PG — SIGNIFICANT CHANGE UP (ref 27–34)
MCHC RBC-ENTMCNC: 32.7 GM/DL — SIGNIFICANT CHANGE UP (ref 32–36)
MCV RBC AUTO: 91.7 FL — SIGNIFICANT CHANGE UP (ref 80–100)
MONOCYTES # BLD AUTO: 0.51 K/UL — SIGNIFICANT CHANGE UP (ref 0–0.9)
MONOCYTES NFR BLD AUTO: 8.4 % — SIGNIFICANT CHANGE UP (ref 2–14)
NEUTROPHILS # BLD AUTO: 2.47 K/UL — SIGNIFICANT CHANGE UP (ref 1.8–7.4)
NEUTROPHILS NFR BLD AUTO: 40.6 % — LOW (ref 43–77)
NRBC # BLD: 0 /100 WBCS — SIGNIFICANT CHANGE UP
NRBC # FLD: 0 K/UL — SIGNIFICANT CHANGE UP
NT-PROBNP SERPL-SCNC: 39 PG/ML — SIGNIFICANT CHANGE UP
PLATELET # BLD AUTO: 238 K/UL — SIGNIFICANT CHANGE UP (ref 150–400)
POTASSIUM SERPL-MCNC: 3.5 MMOL/L — SIGNIFICANT CHANGE UP (ref 3.5–5.3)
POTASSIUM SERPL-SCNC: 3.5 MMOL/L — SIGNIFICANT CHANGE UP (ref 3.5–5.3)
PROT SERPL-MCNC: 8 G/DL — SIGNIFICANT CHANGE UP (ref 6–8.3)
PROTHROM AB SERPL-ACNC: 11.2 SEC — SIGNIFICANT CHANGE UP (ref 10.6–13.6)
RBC # BLD: 5.3 M/UL — SIGNIFICANT CHANGE UP (ref 4.2–5.8)
RBC # FLD: 12.7 % — SIGNIFICANT CHANGE UP (ref 10.3–14.5)
SARS-COV-2 RNA SPEC QL NAA+PROBE: SIGNIFICANT CHANGE UP
SODIUM SERPL-SCNC: 138 MMOL/L — SIGNIFICANT CHANGE UP (ref 135–145)
TROPONIN T, HIGH SENSITIVITY RESULT: 8 NG/L — SIGNIFICANT CHANGE UP
TROPONIN T, HIGH SENSITIVITY RESULT: 8 NG/L — SIGNIFICANT CHANGE UP
WBC # BLD: 6.07 K/UL — SIGNIFICANT CHANGE UP (ref 3.8–10.5)
WBC # FLD AUTO: 6.07 K/UL — SIGNIFICANT CHANGE UP (ref 3.8–10.5)

## 2021-05-04 PROCEDURE — 99053 MED SERV 10PM-8AM 24 HR FAC: CPT

## 2021-05-04 PROCEDURE — 99285 EMERGENCY DEPT VISIT HI MDM: CPT

## 2021-05-04 PROCEDURE — 71046 X-RAY EXAM CHEST 2 VIEWS: CPT | Mod: 26

## 2021-05-04 NOTE — ED PROVIDER NOTE - NSFOLLOWUPINSTRUCTIONS_ED_ALL_ED_FT
Follow up with your cardiologist in 24-48 hours.   Return to the ER if you develop any new or worsening symptoms such as chest pain, shortness of breath, numbness, weakness, abdominal pain, nausea, vomiting, or visual changes.

## 2021-05-04 NOTE — ED PROVIDER NOTE - PROGRESS NOTE DETAILS
pt feeling well, delta trop negative. pt will f/u with his already scheduled cardiology appointment tomorrow. strict return precautions given. Dr. Slater: Pt was signed out to me awaiting repeat trop with plan for f/u with Cardiologist as outpt. Pt states feeling better and denies any current SOB with ambulation.

## 2021-05-04 NOTE — ED ADULT NURSE NOTE - OBJECTIVE STATEMENT
received pt to room 2 aox3 in no apparent distress ambulatory c/o SOB x3 hrs. Pt endorses productive cough that began yesterday, states sputum appears brown. Pt denies chest pain, fevers, weakness, known COVID exposures but works as security in hospital. Pt received 1 dose covid vaccine. Breaths equal and unlabored, able to speak in full sentences, noted to be hypertensive MD bedside aware. NSR on cardiac monitor, 20 G PIV R AC placed labs sent awaiting xray will continue to monitor

## 2021-05-04 NOTE — ED PROVIDER NOTE - OBJECTIVE STATEMENT
75 yoM, PMHx of HLD, s/p cholecystectomy presenting for dyspnea this am. Noticed around 3 am, Has had mild cough with brown phlegm for 2 days. Felt ok going to bed. Woke up to urinate and felt SOB walking back to bed. Denies any pain. Feels slighlty improved now. No fever. Had first Covid vaccine approx 2 weeks ago. Does not smoke or use drugs. No hx of lung disease. Social etoh. Has cardiology appt tomorrow. No hx of VTE. No calf swelling/edema. No travel. No Covid-19 contacts.

## 2021-05-04 NOTE — ED ADULT TRIAGE NOTE - CHIEF COMPLAINT QUOTE
Pt. presents to the ED with shortness of breath that started at 0300. No apparent distress noted, although slight tacypnea noted.  Ambulatory at triage. NAD noted. Daughter, Iesha ([phone]

## 2021-05-04 NOTE — ED PROVIDER NOTE - PHYSICAL EXAMINATION
General: alert, conversant, looks well, vitals reassuring  Head: atraumatic, normocephalic  Eyes: PERRL, EOMI, no scleral icterus  ENT: no epistaxis, moist mucous membranes, normal phonation, airway patent  Neck: full ROM, no midline ttp  CV: RRR, no murmurs, HDS  Pulm: lungs CTA b/l, no wheezing, no respiratory distress, mild tachypnea, normal spo2 on RA  GI: abd soft, non tender, no guarding/rebound/masses  Back: normal ROM, no midline ttp, no signs of trauma  Extremities: normal ROM, joints stable, distal pulses intact, no edema, no calf ttp/edema   Neuro: alert, oriented x3, moving all extremities, interactive, normal speech/memory/gait   Derm: warm, dry, normal color, no rash/wounds

## 2021-05-04 NOTE — ED PROVIDER NOTE - PATIENT PORTAL LINK FT
You can access the FollowMyHealth Patient Portal offered by [facility] by registering at the following website: [email or web address]. By joining [employer]’s FollowMyHealth portal, you will also be able to view your health information using other applications (apps) compatible with our system.

## 2021-05-04 NOTE — ED PROVIDER NOTE - CLINICAL SUMMARY MEDICAL DECISION MAKING FREE TEXT BOX
Theodore, PGY3- 75 yoM, PMHx of HLD, s/p cholecystectomy presenting for dyspnea this am. Noticed around 3 am, Has had mild cough with brown phlegm for 2 days. Felt ok going to bed. Woke up to urinate and felt SOB walking back to bed.  looks well, mild tachypnea, lungs cta, RRR  given cough eval for pna/mild Covid, eval for atypical ACS. No risk factors for PE. Not consistent with aortic disease. R/o anemia. Overall looks well.   Has cards f/u tomorrow. If ekg/troponins reassuring can likely be discharged to appropriate follow up.

## 2021-05-04 NOTE — ED ADULT NURSE NOTE - CHIEF COMPLAINT QUOTE
Pt. presents to the ED with shortness of breath that started at 0300. No apparent distress noted, although slight tacypnea noted.  Ambulatory at triage. NAD noted. Daughter, Iesha ([phone]

## 2021-05-04 NOTE — ED PROVIDER NOTE - ATTENDING CONTRIBUTION TO CARE
76 y/o M with h/o hyperlipidemia here with SOB.  Pt reports for the past 1 day he has been having a mild cough productive of dark/brownish sputum.  This morning around 3am he woke up to use the bathroom.  Upon returning to bed, he noted he was feeling SOB.  No fever, chills, cp, back pain, abd pain, n/v/d, leg pain or swelling.  No tob use.  Pt has received 1st dose of covid vaccine, works security at [employer], so poss sick contacts, but none at home.  No personal cardiac hx and pt reports normal echo and stress test 1 year ago and is scheduled for routine check up with his cardiologist Dr Diaz later today.  Well appearing, sitting comfortably in stretcher, awake and alert, nontoxic.  AF/VSS.  pt noted to be mildly tachpneic to low 20s, but no increased work of breathing and is speaking in full sentences.  Lungs cta bl no rales or wheezing.  Cards nl S1/S2, RRR, no MRG.  Abd soft ntnd.  No pedal edema or calf tenderness.  Plan for ekg, labs incl trop x2, cxr, covid pcr - r/o viral vs bacterial pna, cardiac etiology, reassess.

## 2022-01-03 NOTE — ASU PREOP CHECKLIST - TEMPERATURE IN FAHRENHEIT (DEGREES F)
Apolonia Kiran(PA) Message left for April with test results and MD recommendations. To call with any questions. Rx sent to pharmacy.   98.1

## 2022-10-13 NOTE — ED PROVIDER NOTE - INTERPRETATION
normal sinus rhythm No Residual Tumor Seen Histology Text: There were no malignant cells seen in the sections examined.

## 2022-12-16 NOTE — ED PROVIDER NOTE - CARDIOVASCULAR NEGATIVE STATEMENT, MLM
----- Message from Gerardo Young NP sent at 12/16/2022  7:08 AM EST -----  Labs confirm that you have Chlamydia. Please see me for repeat testing after you complete your antibiotics. no chest pain and no edema.

## 2023-08-16 NOTE — ED ADULT NURSE NOTE - PMH
High cholesterol Calcipotriene Pregnancy And Lactation Text: The use of this medication during pregnancy or lactation is not recommended as there is insufficient data.

## 2023-12-11 NOTE — H&P PST ADULT - NSANTHOSAYNRD_GEN_A_CORE
Risk Statement (NON-critical care) No. ABELINO screening performed.  STOP BANG Legend: 0-2 = LOW Risk; 3-4 = INTERMEDIATE Risk; 5-8 = HIGH Risk

## 2024-01-03 NOTE — ED ADULT TRIAGE NOTE - NS ED TRIAGE AVPU SCALE
Alert-The patient is alert, awake and responds to voice. The patient is oriented to time, place, and person. The triage nurse is able to obtain subjective information. hide

## 2024-02-01 NOTE — ED PROVIDER NOTE - CONSTITUTIONAL NEGATIVE STATEMENT, MLM
no rashes , no jaundice present , good turgor , no masses , no tenderness on palpation no fever and no chills.

## 2025-06-30 NOTE — ED PROVIDER NOTE - CARE PROVIDERS DIRECT ADDRESSES
negative
[email or web address]